# Patient Record
Sex: FEMALE | Race: WHITE | NOT HISPANIC OR LATINO | Employment: PART TIME | URBAN - METROPOLITAN AREA
[De-identification: names, ages, dates, MRNs, and addresses within clinical notes are randomized per-mention and may not be internally consistent; named-entity substitution may affect disease eponyms.]

---

## 2021-11-08 ENCOUNTER — HISTORICAL (OUTPATIENT)
Dept: ADMINISTRATIVE | Facility: HOSPITAL | Age: 26
End: 2021-11-08

## 2021-11-08 LAB
ALBUMIN SERPL-MCNC: 4.5 GM/DL (ref 3.5–5)
ALBUMIN/GLOB SERPL: 1.2 RATIO (ref 1.1–2)
ALP SERPL-CCNC: 57 UNIT/L (ref 40–150)
ALT SERPL-CCNC: 25 UNIT/L (ref 0–55)
AST SERPL-CCNC: 17 UNIT/L (ref 5–34)
BILIRUB SERPL-MCNC: 0.3 MG/DL
BILIRUBIN DIRECT+TOT PNL SERPL-MCNC: 0.1 MG/DL (ref 0–0.5)
BILIRUBIN DIRECT+TOT PNL SERPL-MCNC: 0.2 MG/DL (ref 0–0.8)
BUN SERPL-MCNC: 11.7 MG/DL (ref 7–18.7)
CALCIUM SERPL-MCNC: 10.4 MG/DL (ref 8.7–10.5)
CHLORIDE SERPL-SCNC: 112 MMOL/L (ref 98–107)
CO2 SERPL-SCNC: 18 MMOL/L (ref 22–29)
CREAT SERPL-MCNC: 0.65 MG/DL (ref 0.55–1.02)
EST CREAT CLEARANCE SER (OHS): 206.26 ML/MIN
GLOBULIN SER-MCNC: 3.7 GM/DL (ref 2.4–3.5)
GLUCOSE SERPL-MCNC: 91 MG/DL (ref 74–100)
POTASSIUM SERPL-SCNC: 3.9 MMOL/L (ref 3.5–5.1)
PROT SERPL-MCNC: 8.2 GM/DL (ref 6.4–8.3)
SODIUM SERPL-SCNC: 140 MMOL/L (ref 136–145)
TROPONIN I SERPL-MCNC: 0.01 NG/ML (ref 0–0.04)
TSH SERPL-ACNC: 2.79 UIU/ML (ref 0.35–4.94)

## 2022-01-24 ENCOUNTER — HISTORICAL (OUTPATIENT)
Dept: ADMINISTRATIVE | Facility: HOSPITAL | Age: 27
End: 2022-01-24

## 2022-01-24 LAB
ABS NEUT (OLG): 3.88 X10(3)/MCL (ref 2.1–9.2)
ALBUMIN SERPL-MCNC: 4.5 GM/DL (ref 3.5–5)
ALBUMIN/GLOB SERPL: 1.4 RATIO (ref 1.1–2)
ALP SERPL-CCNC: 58 UNIT/L (ref 40–150)
ALT SERPL-CCNC: 23 UNIT/L (ref 0–55)
AST SERPL-CCNC: 17 UNIT/L (ref 5–34)
BASOPHILS # BLD AUTO: 0.1 X10(3)/MCL (ref 0–0.2)
BASOPHILS NFR BLD AUTO: 1 %
BILIRUB SERPL-MCNC: 0.7 MG/DL
BILIRUBIN DIRECT+TOT PNL SERPL-MCNC: 0.2 MG/DL (ref 0–0.5)
BILIRUBIN DIRECT+TOT PNL SERPL-MCNC: 0.5 MG/DL (ref 0–0.8)
BUN SERPL-MCNC: 13.4 MG/DL (ref 7–18.7)
CALCIUM SERPL-MCNC: 10.1 MG/DL (ref 8.7–10.5)
CHLORIDE SERPL-SCNC: 106 MMOL/L (ref 98–107)
CO2 SERPL-SCNC: 24 MMOL/L (ref 22–29)
CREAT SERPL-MCNC: 0.79 MG/DL (ref 0.55–1.02)
EOSINOPHIL # BLD AUTO: 0.2 X10(3)/MCL (ref 0–0.9)
EOSINOPHIL NFR BLD AUTO: 3 %
ERYTHROCYTE [DISTWIDTH] IN BLOOD BY AUTOMATED COUNT: 12.3 % (ref 11.5–17)
EST. AVERAGE GLUCOSE BLD GHB EST-MCNC: 102.5 MG/DL
GLOBULIN SER-MCNC: 3.3 GM/DL (ref 2.4–3.5)
GLUCOSE SERPL-MCNC: 84 MG/DL (ref 74–100)
HBA1C MFR BLD: 5.2 %
HCT VFR BLD AUTO: 47.3 % (ref 37–47)
HGB BLD-MCNC: 15.3 GM/DL (ref 12–16)
LYMPHOCYTES # BLD AUTO: 3 X10(3)/MCL (ref 0.6–4.6)
LYMPHOCYTES NFR BLD AUTO: 38 %
MCH RBC QN AUTO: 29 PG (ref 27–31)
MCHC RBC AUTO-ENTMCNC: 32.3 GM/DL (ref 33–36)
MCV RBC AUTO: 89.8 FL (ref 80–94)
MONOCYTES # BLD AUTO: 0.5 X10(3)/MCL (ref 0.1–1.3)
MONOCYTES NFR BLD AUTO: 7 %
NEUTROPHILS # BLD AUTO: 3.88 X10(3)/MCL (ref 2.1–9.2)
NEUTROPHILS NFR BLD AUTO: 50 %
PLATELET # BLD AUTO: 363 X10(3)/MCL (ref 130–400)
PMV BLD AUTO: 9.8 FL (ref 9.4–12.4)
POTASSIUM SERPL-SCNC: 4.7 MMOL/L (ref 3.5–5.1)
PROT SERPL-MCNC: 7.8 GM/DL (ref 6.4–8.3)
RBC # BLD AUTO: 5.27 X10(6)/MCL (ref 4.2–5.4)
SODIUM SERPL-SCNC: 141 MMOL/L (ref 136–145)
T4 FREE SERPL-MCNC: 0.88 NG/DL (ref 0.7–1.48)
TSH SERPL-ACNC: 2.24 UIU/ML (ref 0.35–4.94)
WBC # SPEC AUTO: 7.7 X10(3)/MCL (ref 4.5–11.5)

## 2022-02-08 ENCOUNTER — HISTORICAL (OUTPATIENT)
Dept: RADIOLOGY | Facility: HOSPITAL | Age: 27
End: 2022-02-08

## 2022-04-06 LAB
C TRACH DNA SPEC QL NAA+PROBE: NEGATIVE
PAP RECOMMENDATION EXT: NORMAL
PAP SMEAR: NORMAL

## 2022-05-03 NOTE — HISTORICAL [facility]
This is a historical note converted from [employer]. Formatting and pictures may have been removed.  Please reference [employer] for original formatting and attached multimedia. Chief Complaint  dizziness and heart palpatations since OCT 31. Family HX of heart complications  History of Present Illness  Patient presents to Urgent Care for stated complaint during Covid health crisis.  26-year-old female presented to clinic?reporting history of?heart palpitation, anxiety.?Patient states she had an episode?of anxiety?during Halloween,?palpitation restarted?and has not resolved?as well dizziness, denies any recent fall or trauma, denies any headache blurry vision, denies fever?or short of breath?or chest pain?or any other symptoms,?requesting to be referred?to a PCP?and cardiology  Review of Systems  Constitutional: negative except as stated in HPI  Eye: negative except as stated in HPI  ENT: negative except as stated in HPI  Respiratory: negative except as stated in HPI  Cardiovascular: negative except as stated in HPI  Gastrointestinal: negative except as stated in HPI  Genitourinary: negative except as stated in HPI  Hema/Lymph: negative except as stated in HPI  Endocrine: negative except as stated in HPI  Immunologic: negative except as stated in HPI  Musculoskeletal: negative except as stated in HPI  Integumentary: negative except as stated in HPI  Neurologic: negative except as stated in HPI  Physical Exam  Vitals & Measurements  T:?36.9? ?C (Oral)? HR:?89(Peripheral)? RR:?18? BP:?144/84? BP:?120/81(Sitting)? BP:?123/90(Standing)? BP:?118/77(Supine)? SpO2:?97%?  HT:?172.00?cm? WT:?97.900?kg? BMI:?33.09?  Vital signs: Reviewed  General: in no apparent distress, appropriate for age  Head: no signs of head trauma  Eyes: pupils are reactive. ?Extraocular motions intact, conjunctival pink.?  ENT: Bilateral ear canal clear, no edema, no discharge or bleeding. ?Bilateral TMs intact, pearly gray, no effusion, no retraction or  perforation or bulging.  Nose: no sinus tenderness, nasal turbinate normal no erythema, clear nasal discharge.  Mouth: posterior pharynx-clear, uvula midline, facial symmetrical, tongue symmetrical.  Neck :no adenopathy, supple, full range of motion, nontender  Respiratory: clear breath sounds bilaterally. ?No wheezing  Cardiac :regular, no murmur, radial pulses 2+, cap refill less than 2 seconds  Gastrointestinal: Soft, nontender  Musculoskeletal: Moves all extremities, ambulatory without assistant,?bilateral handgrip strong and equal, no arm drift  Integumentary: No rashes or skin lesions visible  Neurologic: Cranial nerves grossly intact, no signs of peripheral neurological deficit  Assessment/Plan  1.?Palpitation?R00.2  Discussed physical exam findings and test results with patient?EKG?statement?shows nonspecific ST and T wave abnormality, noN convincing,?no previous EKG to compare,?troponin I?and CMP?blood work done today?we will notify patient of results, orthostatics within normal limits?PCP referral?and cardiology referral?initiated  Instructed patient to come back to clinic or go to emergency room department if symptom worsens or no improvement or for any other reasons?or call 911  Questions elicited and answered  Patient verbalized understanding of discharge instructions,? will read discharge education instructions  Ordered:  Comprehensive Metabolic Panel, Stat collect, 11/08/21 17:55:00 CST, Blood, Once, Stop date 11/08/21 17:55:00 CST, Nurse collect, Palpitation  Dizziness, 11/08/21 17:55:00 CST  Electrocardiogram, Complete [id number] PC, 11/08/21 17:25:00 CST, 11/08/21 17:25:00 CST, Palpitation  Office/Outpatient Visit Level 4 New [id number] PC, Palpitation  Dizziness, 11/08/21 18:03:00 CST  Orthostatic Vital Signs, 11/08/21 17:54:00 CST, Stop date 11/08/21 17:54:00 CST  Thyroid Stimulating Hormone, Stat collect, 11/08/21 18:05:00 CST, Blood, Stop date 11/08/21 18:05:00 CST, Nurse collect, Palpitation   Dizziness, 11/08/21 18:05:00 CST  Troponin-I, Stat collect, 11/08/21 17:55:00 CST, Blood, Stop date 11/08/21 17:55:00 CST, Nurse collect, Palpitation  Dizziness, 11/08/21 17:55:00 CST  ?  2.?Dizziness?R42  ?As discussed in #1  Ordered:  Comprehensive Metabolic Panel, Stat collect, 11/08/21 17:55:00 CST, Blood, Once, Stop date 11/08/21 17:55:00 CST, Nurse collect, Palpitation  Dizziness, 11/08/21 17:55:00 CST  Office/Outpatient Visit Level 4 New [id number] PC, Palpitation  Dizziness, 11/08/21 18:03:00 CST  Orthostatic Vital Signs, 11/08/21 17:54:00 CST, Stop date 11/08/21 17:54:00 CST  Thyroid Stimulating Hormone, Stat collect, 11/08/21 18:05:00 CST, Blood, Stop date 11/08/21 18:05:00 CST, Nurse collect, Palpitation  Dizziness, 11/08/21 18:05:00 CST  Troponin-I, Stat collect, 11/08/21 17:55:00 CST, Blood, Stop date 11/08/21 17:55:00 CST, Nurse collect, Palpitation  Dizziness, 11/08/21 17:55:00 CST  ?  Referrals  [facility] Internal Referral to Cardiology Clinic, Specialty: Cardiology, Reason: Palpitation, Start: 11/08/21 18:03:00 CST  [facility] Internal Referral to Internal Medicine Clinic, Specialty: Internal Medicine, Reason: Establish PCP, Start: 11/08/21 18:04:00 CST   Problem List/Past Medical History  Ongoing  Obesity  Historical  Anxiety  Medications  Zoloft 100 mg oral tablet, 150 mg= 1.5 tab(s), Oral, Daily, 3 refills,? ?Not taking  Allergies  No Known Medication Allergies  Social History  Abuse/Neglect  No, 09/03/2019  Alcohol  Current, 1-2 times per month, 09/03/2019  Employment/School  Part time, Highest education level: High school., 09/03/2019  Exercise  Exercise frequency: 1-2 times/week. Exercise type: Walking., 09/03/2019  Home/Environment  Lives with Mother. Living situation: Home/Independent., 09/03/2019  Nutrition/Health  Regular, 09/03/2019  Sexual  Sexually active: Yes., 09/03/2019  Spiritual/Cultural  None, 09/03/2019  Substance Use  Past, 1-2 times per month, 11/08/2021  Never, 09/03/2019  Tobacco  Smoker,  current status unknown, No, 09/03/2019  Family History  Congestive heart disease.: Mother and Father.  Diabetes mellitus type 2: Mother and Father.  Primary malignant neoplasm of colon: Mother and Father.  Uterine cancer: Mother.  Immunizations  Vaccine Date Status   poliovirus vaccine, inactivated 07/09/2002 Recorded   measles/mumps/rubella virus vaccine 07/09/2002 Recorded   hepatitis B pediatric vaccine 07/09/2002 Recorded   varicella virus vaccine 09/15/1998 Recorded   measles/mumps/rubella virus vaccine 09/15/1998 Recorded   hepatitis B pediatric vaccine 03/12/1998 Recorded   hepatitis B pediatric vaccine 1997 Recorded   Health Maintenance  Health Maintenance  ???Pending?(in the next year)  ??? ??OverDue  ??? ? ? ?Alcohol Misuse Screening due??01/02/21??and every 1??year(s)  ??? ??Due?  ??? ? ? ?Cervical Cancer Screening due??11/08/21??Unknown Frequency  ??? ? ? ?Tetanus Vaccine due??11/08/21??and every 10??year(s)  ??? ??Refused?  ??? ? ? ?Smoking Cessation due??01/01/21??Variable frequency  ??? ??Due In Future?  ??? ? ? ?Obesity Screening not due until??01/01/22??and every 1??year(s)  ???Satisfied?(in the past 1 year)  ??? ??Satisfied?  ??? ? ? ?ADL Screening on??11/08/21.??Satisfied by Mehul Barrios LPN  ??? ? ? ?Blood Pressure Screening on??11/08/21.??Satisfied by Ysabel Dobson LPN  ??? ? ? ?Body Mass Index Check on??11/08/21.??Satisfied by Mehul Barrios LPN  ??? ? ? ?Depression Screening on??11/08/21.??Satisfied by Mehul Barrios LPN  ??? ? ? ?Influenza Vaccine on??11/08/21.??Satisfied by Mehul Barrios LPN  ??? ? ? ?Obesity Screening on??11/08/21.??Satisfied by Mehul Barrios LPN  ??? ??Refused?  ??? ? ? ?Smoking Cessation on??11/08/21.??Recorded by Sophie CHAVEZ, Mehul Nolen??Reason: Patient Refuses  ?

## 2022-06-02 ENCOUNTER — OFFICE VISIT (OUTPATIENT)
Dept: URGENT CARE | Facility: CLINIC | Age: 27
End: 2022-06-02
Payer: MEDICAID

## 2022-06-02 ENCOUNTER — HOSPITAL ENCOUNTER (OUTPATIENT)
Dept: RADIOLOGY | Facility: HOSPITAL | Age: 27
Discharge: HOME | End: 2022-06-02
Attending: NURSE PRACTITIONER
Payer: MEDICAID

## 2022-06-02 VITALS
RESPIRATION RATE: 20 BRPM | HEIGHT: 69 IN | HEART RATE: 97 BPM | DIASTOLIC BLOOD PRESSURE: 73 MMHG | BODY MASS INDEX: 33.38 KG/M2 | TEMPERATURE: 98 F | WEIGHT: 225.38 LBS | OXYGEN SATURATION: 98 % | SYSTOLIC BLOOD PRESSURE: 108 MMHG

## 2022-06-02 DIAGNOSIS — M79.672 LEFT FOOT PAIN: ICD-10-CM

## 2022-06-02 DIAGNOSIS — S92.352A DISPLACED FRACTURE OF FIFTH METATARSAL BONE, LEFT FOOT, INITIAL ENCOUNTER FOR CLOSED FRACTURE: Primary | ICD-10-CM

## 2022-06-02 PROCEDURE — 73630 X-RAY EXAM OF FOOT: CPT | Mod: 26,LT,, | Performed by: RADIOLOGY

## 2022-06-02 PROCEDURE — 99213 PR OFFICE/OUTPT VISIT, EST, LEVL III, 20-29 MIN: ICD-10-PCS | Mod: S$PBB,,, | Performed by: NURSE PRACTITIONER

## 2022-06-02 PROCEDURE — 99213 OFFICE O/P EST LOW 20 MIN: CPT | Mod: S$PBB,,, | Performed by: NURSE PRACTITIONER

## 2022-06-02 PROCEDURE — 25000003 PHARM REV CODE 250: Performed by: NURSE PRACTITIONER

## 2022-06-02 PROCEDURE — 73630 XR FOOT COMPLETE 3 VIEW LEFT: ICD-10-PCS | Mod: 26,LT,, | Performed by: RADIOLOGY

## 2022-06-02 PROCEDURE — 99214 OFFICE O/P EST MOD 30 MIN: CPT | Mod: PBBFAC | Performed by: NURSE PRACTITIONER

## 2022-06-02 PROCEDURE — 73630 X-RAY EXAM OF FOOT: CPT | Mod: TC,LT

## 2022-06-02 RX ORDER — ACETAMINOPHEN 500 MG
1000 TABLET ORAL
Status: COMPLETED | OUTPATIENT
Start: 2022-06-02 | End: 2022-06-02

## 2022-06-02 RX ADMIN — ACETAMINOPHEN 1000 MG: 500 TABLET ORAL at 10:06

## 2022-06-02 NOTE — PROCEDURES
Procedures: 4 in Orthoglass bright to left foot posteriorly and secured with 4 in Ace wrap.  Crutches dispensed patient demonstrated and verbalized understanding of use. Instructed patient to elevate ice and rest.

## 2022-06-02 NOTE — PROGRESS NOTES
"Subjective:      Patient ID: Yessy Brooks is a 26 y.o. female.    Chief Complaint: Foot Injury (Fell on left foot on yesterday, last night patient states felt her toes go numb.)     26-year-old female presents to the clinic reporting she was hopping on her left foot yesterday which caused her to twisted and fall. Patient report top of the foot with swelling and bruised. Patient wanted to make sure everything is okay and nothing is broken. Patient is ambulating without assistant. Denies any head, denies any loss of consciousness, denies being pregnant.    Review of Systems   Musculoskeletal: Positive for arthralgias (Left dorsal foot pain).   All other systems reviewed and are negative.      /73   Pulse 97   Temp 98.2 °F (36.8 °C)   Resp 20   Ht 5' 9" (1.753 m)   Wt 102.2 kg (225 lb 6.4 oz)   LMP  (LMP Unknown)   SpO2 98%   BMI 33.29 kg/m²    No current outpatient medications on file.  No current facility-administered medications for this visit.    Objective:     Physical Exam  Vitals and nursing note reviewed.   Constitutional:       Appearance: Normal appearance.   HENT:      Head: Normocephalic and atraumatic.      Right Ear: Tympanic membrane, ear canal and external ear normal.      Left Ear: Tympanic membrane, ear canal and external ear normal.      Nose: Nose normal.      Mouth/Throat:      Mouth: Mucous membranes are moist.      Pharynx: Oropharynx is clear.   Eyes:      Extraocular Movements: Extraocular movements intact.      Conjunctiva/sclera: Conjunctivae normal.      Pupils: Pupils are equal, round, and reactive to light.   Cardiovascular:      Rate and Rhythm: Normal rate and regular rhythm.      Pulses: Normal pulses.      Heart sounds: Normal heart sounds. No murmur heard.  Pulmonary:      Effort: Pulmonary effort is normal.      Breath sounds: Normal breath sounds. No wheezing.   Musculoskeletal:         General: Swelling and tenderness present. No deformity. Normal range of " motion.      Cervical back: Normal range of motion and neck supple.      Comments: Left foot dorsal aspect.   Skin:     General: Skin is warm.      Capillary Refill: Capillary refill takes less than 2 seconds.      Findings: Bruising (Left dorsal foot) present.   Neurological:      General: No focal deficit present.      Mental Status: She is alert and oriented to person, place, and time. Mental status is at baseline.      Sensory: No sensory deficit.   Psychiatric:         Mood and Affect: Mood normal.         Behavior: Behavior normal.         Thought Content: Thought content normal.         Judgment: Judgment normal.       Assessment:     Problem List Items Addressed This Visit    None     Visit Diagnoses     Displaced fracture of fifth metatarsal bone, left foot, initial encounter for closed fracture    -  Primary    Relevant Orders    Ambulatory referral/consult to Orthopedics    Left foot pain        Relevant Medications    acetaminophen tablet 1,000 mg (Completed)    Other Relevant Orders    X-Ray Foot Complete Left (Completed)    Ambulatory referral/consult to Orthopedics          Plan:   Discussed physical exam findings with patient,  Fracture of the proximal 5th metatarsal  Of left foot .  Left foot posterior 4 in Orthoglass splint applied, crutches dispensed.  Tylenol 1 g given the clinic for pain and discomfort.   ortho referral initiated  Instructed patient to notify his/ her primary care provider regarding the visit today and schedule a follow-up appointment in 2-3 days  instructed patient to come back to the clinic or go to nearest emergency room department if symptoms worsens or no improvement or for any other reason   questions elicited and answered  Patient verbalized understanding of discharge instructions,  verbalizes understanding to read discharge instructions    Displaced fracture of fifth metatarsal bone, left foot, initial encounter for closed fracture  -     Ambulatory referral/consult to  Orthopedics    Left foot pain  -     X-Ray Foot Complete Left; Future; Expected date: 06/02/2022  -     acetaminophen tablet 1,000 mg  -     Ambulatory referral/consult to Orthopedics         Recent Lab Results     None         X-Ray Foot Complete Left    Result Date: 6/2/2022  EXAMINATION: XR FOOT COMPLETE 3 VIEW LEFT CLINICAL HISTORY: TWISTED LEFT FOOT/  BRUISING AND SWELLING NOTED TO  DORSAL ASPECT OF FOOT;  Pain in left foot TECHNIQUE: Radiographs of the left foot with AP, lateral and oblique  views. COMPARISON: No prior imaging available for comparison FINDINGS: Fracture of the proximal 5th metatarsal.  No additional fracture appreciated.     Minimally displaced fracture of the proximal 5th metatarsal. Electronically signed by: Fam Sutton Date:    06/02/2022 Time:    11:02        This note was created with the assistance of a voice recognition software or  phone dictation. There may be transcription errors as a result of using this technology, however minimal effort has been made to assure accuracy of transcription, but any obvious errors or omissions should be clarified with the author of the document.

## 2022-06-07 ENCOUNTER — CLINICAL SUPPORT (OUTPATIENT)
Dept: GYNECOLOGY | Facility: CLINIC | Age: 27
End: 2022-06-07
Payer: MEDICAID

## 2022-06-07 DIAGNOSIS — Z23 NEED FOR HPV VACCINE: Primary | ICD-10-CM

## 2022-06-07 PROCEDURE — 99211 OFF/OP EST MAY X REQ PHY/QHP: CPT | Mod: PBBFAC

## 2022-06-07 PROCEDURE — 90651 9VHPV VACCINE 2/3 DOSE IM: CPT | Mod: PBBFAC

## 2022-06-07 NOTE — PROGRESS NOTES
2nd HPV Vaccine administered per [facility] HPV Protocol. 2 patient identifiers used/verified. Patient tolerated well and left in stable condition.

## 2022-06-27 ENCOUNTER — HOSPITAL ENCOUNTER (OUTPATIENT)
Dept: RADIOLOGY | Facility: HOSPITAL | Age: 27
Discharge: HOME | End: 2022-06-27
Attending: FAMILY MEDICINE
Payer: MEDICAID

## 2022-06-27 ENCOUNTER — OFFICE VISIT (OUTPATIENT)
Dept: URGENT CARE | Facility: CLINIC | Age: 27
End: 2022-06-27
Payer: MEDICAID

## 2022-06-27 VITALS
HEIGHT: 69 IN | HEART RATE: 95 BPM | DIASTOLIC BLOOD PRESSURE: 80 MMHG | RESPIRATION RATE: 20 BRPM | OXYGEN SATURATION: 97 % | TEMPERATURE: 99 F | BODY MASS INDEX: 33.33 KG/M2 | SYSTOLIC BLOOD PRESSURE: 120 MMHG | WEIGHT: 225 LBS

## 2022-06-27 DIAGNOSIS — S92.355D CLOSED NONDISPLACED FRACTURE OF FIFTH METATARSAL BONE OF LEFT FOOT WITH ROUTINE HEALING, SUBSEQUENT ENCOUNTER: ICD-10-CM

## 2022-06-27 DIAGNOSIS — S92.355D CLOSED NONDISPLACED FRACTURE OF FIFTH METATARSAL BONE OF LEFT FOOT WITH ROUTINE HEALING, SUBSEQUENT ENCOUNTER: Primary | ICD-10-CM

## 2022-06-27 PROCEDURE — 73630 XR FOOT COMPLETE 3 VIEW LEFT: ICD-10-PCS | Mod: 26,LT,, | Performed by: RADIOLOGY

## 2022-06-27 PROCEDURE — 73630 X-RAY EXAM OF FOOT: CPT | Mod: 26,LT,, | Performed by: RADIOLOGY

## 2022-06-27 PROCEDURE — 99213 OFFICE O/P EST LOW 20 MIN: CPT | Mod: PBBFAC | Performed by: FAMILY MEDICINE

## 2022-06-27 PROCEDURE — 99214 PR OFFICE/OUTPT VISIT, EST, LEVL IV, 30-39 MIN: ICD-10-PCS | Mod: S$PBB,,, | Performed by: FAMILY MEDICINE

## 2022-06-27 PROCEDURE — 73630 X-RAY EXAM OF FOOT: CPT | Mod: TC,LT

## 2022-06-27 PROCEDURE — 99214 OFFICE O/P EST MOD 30 MIN: CPT | Mod: S$PBB,,, | Performed by: FAMILY MEDICINE

## 2022-06-27 NOTE — PROGRESS NOTES
"Subjective:       Patient ID: Yessy Brooks is a 26 y.o. female.    Vitals:  height is 5' 8.9" (1.75 m) and weight is 102.1 kg (225 lb). Her temperature is 99 °F (37.2 °C). Her blood pressure is 120/80 and her pulse is 95. Her respiration is 20 and oxygen saturation is 97%.     Chief Complaint: Foot Injury (Seen on June 2 states has lt foot fracture, needs referral to ortho) and Referral (ortho)    Patient presents to urgent care for follow-up.  History of left proximal 5th metatarsal fracture on June 1st.  Was seen in urgent care on June 2nd.  Placed in posterior splint and has been nonweightbearing.  There has been a system problem obtaining an orthopedic referral appointment.  I am unsure if this has been resolved.  She has been doing well, using crutches, having no significant pain, no numbness or tingling.  Has had no swelling of the toes.     Foot Injury          ROS    Objective:      Physical Exam    VITAL SIGNS:  Reviewed.      GENERAL:  In no apparent distress  HEAD:  No signs of head trauma    MUSCULOSKELETAL:  Left foot-patient in posterior splint.  Removed.  Small amount of soft tissue swelling over the base of the 5th metatarsal, minimal tenderness.  Ankle has full range of motion.  No vascular compromise of the foot.  NEUROLOGIC EXAM:  Alert and oriented x 3.  No focal sensory or strength deficits.   Speech normal.  Follows commands    XR FOOT COMPLETE 3 VIEW LEFT    Result Date: 6/27/2022  EXAMINATION: XR FOOT COMPLETE 3 VIEW LEFT CLINICAL HISTORY: Nondisplaced fracture of fifth metatarsal bone, left foot, subsequent encounter for fracture with routine healing History of proximal 5th metatarsal fracture 3-4 weeks ago; COMPARISON: X-rays dated 06/02/2022 FINDINGS: Redemonstrated is a nondisplaced fracture of the base of the 5th metatarsal with slight bony resorption along the fracture lines.  There is no new acute fracture identified.  The soft tissues are unremarkable.     Stable alignment of the " nondisplaced 5th metatarsal base fracture. Electronically signed by: Maddy Garner Date:    06/27/2022 Time:    17:18    X-Ray Foot Complete Left    Result Date: 6/2/2022  EXAMINATION: XR FOOT COMPLETE 3 VIEW LEFT CLINICAL HISTORY: TWISTED LEFT FOOT/  BRUISING AND SWELLING NOTED TO  DORSAL ASPECT OF FOOT;  Pain in left foot TECHNIQUE: Radiographs of the left foot with AP, lateral and oblique  views. COMPARISON: No prior imaging available for comparison FINDINGS: Fracture of the proximal 5th metatarsal.  No additional fracture appreciated.     Minimally displaced fracture of the proximal 5th metatarsal. Electronically signed by: Fam Sutton Date:    06/02/2022 Time:    11:02    Assessment:       1. Closed nondisplaced fracture of fifth metatarsal bone of left foot with routine healing, subsequent encounter          Plan:         Closed nondisplaced fracture of fifth metatarsal bone of left foot with routine healing, subsequent encounter  -     XR FOOT COMPLETE 3 VIEW LEFT; Future; Expected date: 06/27/2022         Discussed x-ray.  Reviewed films.  Placed in cam boot, no weight-bearing.  Instructed her to wait for Orthopedic Clinic to contact her.  Return to urgent care if any

## 2022-07-08 ENCOUNTER — OFFICE VISIT (OUTPATIENT)
Dept: ORTHOPEDICS | Facility: CLINIC | Age: 27
End: 2022-07-08
Payer: MEDICAID

## 2022-07-08 ENCOUNTER — HOSPITAL ENCOUNTER (OUTPATIENT)
Dept: RADIOLOGY | Facility: HOSPITAL | Age: 27
Discharge: HOME | End: 2022-07-08
Attending: ORTHOPAEDIC SURGERY
Payer: MEDICAID

## 2022-07-08 VITALS — HEIGHT: 69 IN | WEIGHT: 220 LBS | BODY MASS INDEX: 32.58 KG/M2

## 2022-07-08 DIAGNOSIS — M79.672 LEFT FOOT PAIN: ICD-10-CM

## 2022-07-08 DIAGNOSIS — M79.672 LEFT FOOT PAIN: Primary | ICD-10-CM

## 2022-07-08 PROCEDURE — 99204 OFFICE O/P NEW MOD 45 MIN: CPT | Mod: S$PBB,,, | Performed by: ORTHOPAEDIC SURGERY

## 2022-07-08 PROCEDURE — 1159F MED LIST DOCD IN RCRD: CPT | Mod: CPTII,,, | Performed by: ORTHOPAEDIC SURGERY

## 2022-07-08 PROCEDURE — 1159F PR MEDICATION LIST DOCUMENTED IN MEDICAL RECORD: ICD-10-PCS | Mod: CPTII,,, | Performed by: ORTHOPAEDIC SURGERY

## 2022-07-08 PROCEDURE — 1160F RVW MEDS BY RX/DR IN RCRD: CPT | Mod: CPTII,,, | Performed by: ORTHOPAEDIC SURGERY

## 2022-07-08 PROCEDURE — 3008F BODY MASS INDEX DOCD: CPT | Mod: CPTII,,, | Performed by: ORTHOPAEDIC SURGERY

## 2022-07-08 PROCEDURE — 99204 PR OFFICE/OUTPT VISIT, NEW, LEVL IV, 45-59 MIN: ICD-10-PCS | Mod: S$PBB,,, | Performed by: ORTHOPAEDIC SURGERY

## 2022-07-08 PROCEDURE — 1160F PR REVIEW ALL MEDS BY PRESCRIBER/CLIN PHARMACIST DOCUMENTED: ICD-10-PCS | Mod: CPTII,,, | Performed by: ORTHOPAEDIC SURGERY

## 2022-07-08 PROCEDURE — 99212 OFFICE O/P EST SF 10 MIN: CPT | Mod: PBBFAC

## 2022-07-08 PROCEDURE — 3008F PR BODY MASS INDEX (BMI) DOCUMENTED: ICD-10-PCS | Mod: CPTII,,, | Performed by: ORTHOPAEDIC SURGERY

## 2022-07-08 PROCEDURE — 73630 X-RAY EXAM OF FOOT: CPT | Mod: TC,LT

## 2022-07-08 NOTE — PROGRESS NOTES
[facility]  Established Patient Office Visit  07/08/2022       Patient ID: Yessy Brooks  YOB: 1995  MRN: [id number]    Diagnosis:    The encounter diagnosis was Left foot pain.     Chief Complaint: Fracture of the Left Foot      Yessy Brooks is a 26 y.o. female who presents as a new patient for treatment of the above mentioned diagnosis.  Patient states she twisted her foot on June 1, 2022 while walking in her backyard.  She had pain and swelling on the lateral aspect of her foot.  She went to her local emergency room and was diagnosed with a fracture of the base of her 5th metatarsal.  She was referred to our clinic for follow-up assessment and tentative management.    Patient has been nonweightbearing and wearing a cam boot since the time of injury.    Occupation:    Sports/Hobbies:  None   Hand dominance:  Not applicable  Smoking:  None    Past Medical History:    History reviewed. No pertinent past medical history.  History reviewed. No pertinent surgical history.  Family History   Problem Relation Age of Onset    Hypertension Mother     Diabetes Mother     Heart disease Mother     Heart disease Father      Social History     Socioeconomic History    Marital status: Single   Tobacco Use    Smoking status: Never Smoker    Smokeless tobacco: Never Used   Substance and Sexual Activity    Alcohol use: Yes    Drug use: Never    Sexual activity: Yes       Review of patient's allergies indicates:  No Known Allergies    ROS:    Body mass index is 32.49 kg/m².  GENERAL: Well appearing, appropriate for stated age, no acute distress.  CARDIOVASCULAR: Pulses regular by peripheral palpation.  PULMONARY: Respirations are even and non-labored.  NEURO: Awake, alert, and oriented x 3.  PSYCH: Mood & affect are appropriate.  HEENT: Head is normocephalic and atraumatic.    Physical Exam:    Left foot:  Patient neurovascular intact distally the left  foot.  Skin is intact it is closed injury.  There is no tenderness to palpation at the fracture site.    Imaging:    No image results found.     Relevant imaging results reviewed and interpreted by me, discussed with the patient and / or family today.     X-ray performed today reviewed patient demonstrates a nondisplaced fracture of the base of her 5th metatarsal.    Assessment and Plan:    Yessy Brooks is a 26 y.o. female seen in the office today for The encounter diagnosis was Left foot pain..  Overall patient is doing very well now more than 5 weeks status post fracture of the base of her 5th metatarsal on the left side.  Patient may advance her weight-bearing to weight-bearing as tolerated and discontinue use of her Cam boot.  She may return to all of her activities with no restrictions.  I will see her back in 6 weeks time for a final clinical check.      Orders Placed This Encounter    X-Ray Foot Complete Left

## 2022-08-08 ENCOUNTER — OFFICE VISIT (OUTPATIENT)
Dept: FAMILY MEDICINE | Facility: CLINIC | Age: 27
End: 2022-08-08
Payer: MEDICAID

## 2022-08-08 VITALS
TEMPERATURE: 99 F | BODY MASS INDEX: 33.46 KG/M2 | DIASTOLIC BLOOD PRESSURE: 89 MMHG | OXYGEN SATURATION: 97 % | RESPIRATION RATE: 18 BRPM | SYSTOLIC BLOOD PRESSURE: 133 MMHG | WEIGHT: 225.88 LBS | HEIGHT: 69 IN | HEART RATE: 82 BPM

## 2022-08-08 DIAGNOSIS — E66.9 CLASS 1 OBESITY WITH BODY MASS INDEX (BMI) OF 33.0 TO 33.9 IN ADULT, UNSPECIFIED OBESITY TYPE, UNSPECIFIED WHETHER SERIOUS COMORBIDITY PRESENT: Chronic | ICD-10-CM

## 2022-08-08 DIAGNOSIS — F41.9 ANXIETY: Primary | ICD-10-CM

## 2022-08-08 DIAGNOSIS — Z87.898 HISTORY OF PALPITATIONS: ICD-10-CM

## 2022-08-08 PROCEDURE — 1159F PR MEDICATION LIST DOCUMENTED IN MEDICAL RECORD: ICD-10-PCS | Mod: CPTII,,,

## 2022-08-08 PROCEDURE — 1159F MED LIST DOCD IN RCRD: CPT | Mod: CPTII,,,

## 2022-08-08 PROCEDURE — 99213 PR OFFICE/OUTPT VISIT, EST, LEVL III, 20-29 MIN: ICD-10-PCS | Mod: S$PBB,,,

## 2022-08-08 PROCEDURE — 99213 OFFICE O/P EST LOW 20 MIN: CPT | Mod: PBBFAC,PN

## 2022-08-08 PROCEDURE — 3008F PR BODY MASS INDEX (BMI) DOCUMENTED: ICD-10-PCS | Mod: CPTII,,,

## 2022-08-08 PROCEDURE — 3079F PR MOST RECENT DIASTOLIC BLOOD PRESSURE 80-89 MM HG: ICD-10-PCS | Mod: CPTII,,,

## 2022-08-08 PROCEDURE — 1160F PR REVIEW ALL MEDS BY PRESCRIBER/CLIN PHARMACIST DOCUMENTED: ICD-10-PCS | Mod: CPTII,,,

## 2022-08-08 PROCEDURE — 1160F RVW MEDS BY RX/DR IN RCRD: CPT | Mod: CPTII,,,

## 2022-08-08 PROCEDURE — 3079F DIAST BP 80-89 MM HG: CPT | Mod: CPTII,,,

## 2022-08-08 PROCEDURE — 3008F BODY MASS INDEX DOCD: CPT | Mod: CPTII,,,

## 2022-08-08 PROCEDURE — 3075F PR MOST RECENT SYSTOLIC BLOOD PRESS GE 130-139MM HG: ICD-10-PCS | Mod: CPTII,,,

## 2022-08-08 PROCEDURE — 99213 OFFICE O/P EST LOW 20 MIN: CPT | Mod: S$PBB,,,

## 2022-08-08 PROCEDURE — 3075F SYST BP GE 130 - 139MM HG: CPT | Mod: CPTII,,,

## 2022-08-08 NOTE — PROGRESS NOTES
Patient Name: Yessy Brooks   : 1995  MRN: [id number]     Subjective:   Patient ID: Yessy Brooks is a 27 y.o. female.    Chief Complaint:   Chief Complaint   Patient presents with    Follow-up        HPI: 2022:  Following up on her anxiety and history of palpitations.  Since last visit she has had her annual Pap smear within normal limits with gyn Clinic.  Her Holter monitor showed no abnormalities.  Patient states also that her palpitations have resolved.  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  Other than recently breaking her left foot she has no complaints.    22: 26 Years  Female presents for follow up visit. patient followed up with cardiology on  they ordered a 30-day Holter monitor and stress test to further assess possible arrhythmias.  TTE on  showed ejection fraction of 62% with no valvular   abnormalities. Patient states her anxiety is much better, today her KEVEN is 9 (previously 12). She states she has essentially less palpitations. Denies chest pain, shortness of breath, cough, headache, dizziness, weakness, abdominal pain, nausea, vomiting, diarrhea, constipation, dysuria, depression, anxiety, SI, and HI.     22: Patient's main concerns are anxiety and palpitations, she used to be on Zoloft 50 mg for a couple of years stopped taking it over 3 years ago.  KEVEN today is 12.  She states her anxiety is much better than it used to be.  She participates in counseling with [facility] [employer] [employer].   Patient was worked up in the urgent care on  for palpitations, troponin and EKG were within normal limits.  At this time she was referred to cardiology clinic.  She has an appointment on  for this.  She states that they had no further work-up at this time.  Patient does endorse that she has high anxiety.  Denies shortness of breath or palpitations at this time      ROS:  Review of Systems   Constitutional: Negative for  "chills, fever and weight loss.   HENT: Negative for ear discharge, nosebleeds and tinnitus.    Eyes: Negative for blurred vision, photophobia and pain.   Respiratory: Negative for cough, shortness of breath, wheezing and stridor.    Cardiovascular: Negative for chest pain, palpitations and orthopnea.   Gastrointestinal: Negative for abdominal pain, heartburn and nausea.   Genitourinary: Negative for dysuria, frequency, hematuria and urgency.   Musculoskeletal: Positive for joint pain. Negative for falls and myalgias.   Skin: Negative for itching and rash.   Neurological: Negative for dizziness, sensory change, speech change, focal weakness, seizures, weakness and headaches.   Endo/Heme/Allergies: Negative for environmental allergies. Does not bruise/bleed easily.   Psychiatric/Behavioral: Negative for depression, hallucinations and suicidal ideas. The patient is nervous/anxious.       History:   History reviewed. No pertinent past medical history.   History reviewed. No pertinent surgical history.  Family History   Problem Relation Age of Onset    Hypertension Mother     Diabetes Mother     Heart disease Mother     Heart disease Father       Social History     Tobacco Use    Smoking status: Never Smoker    Smokeless tobacco: Never Used   Substance and Sexual Activity    Alcohol use: Yes    Drug use: Never    Sexual activity: Yes        Allergies: Review of patient's allergies indicates:  No Known Allergies  Objective:     Vitals:    08/08/22 0719   BP: 133/89   Pulse: 82   Resp: 18   Temp: 99.3 °F (37.4 °C)   SpO2: 97%   Weight: 102.5 kg (225 lb 14.4 oz)   Height: 5' 9" (1.753 m)     Body mass index is 33.36 kg/m².     Physical Examination:   Physical Exam  Vitals reviewed.   Constitutional:       Appearance: Normal appearance. She is normal weight.   HENT:      Head: Normocephalic.      Right Ear: Tympanic membrane, ear canal and external ear normal.      Left Ear: Tympanic membrane, ear canal and external " ear normal.      Nose: Nose normal.      Mouth/Throat:      Mouth: Mucous membranes are moist.      Pharynx: Oropharynx is clear.   Eyes:      Extraocular Movements: Extraocular movements intact.      Conjunctiva/sclera: Conjunctivae normal.      Pupils: Pupils are equal, round, and reactive to light.   Cardiovascular:      Rate and Rhythm: Normal rate and regular rhythm.      Pulses: Normal pulses.      Heart sounds: Normal heart sounds.   Pulmonary:      Effort: Pulmonary effort is normal.      Breath sounds: Normal breath sounds.   Abdominal:      General: Abdomen is flat. Bowel sounds are normal.      Palpations: Abdomen is soft.   Musculoskeletal:      Cervical back: Normal range of motion and neck supple.      Left ankle: Decreased range of motion.   Skin:     General: Skin is warm and dry.   Neurological:      General: No focal deficit present.      Mental Status: She is alert and oriented to person, place, and time.   Psychiatric:         Mood and Affect: Mood normal.         Behavior: Behavior normal.         Assessment:     Problem List Items Addressed This Visit        Psychiatric    Anxiety - Primary (Chronic)    Overview       Practice deep breathing or abdominal breathing exercises when anxiety occurs.  Exercise daily. Get sunlight daily.  Avoid caffeine, alcohol and stimulants.  Practice positive phrases and repeat throughout the day, yoga, lavender scents or Chamomile tea will help anxiety.  Set healthy boundaries, avoid people and conversations that increase stress.  Reports any symptoms of suicidal or homicidal ideations immediately, if clinic is closed go to nearest emergency room.                  Cardiac/Vascular    History of palpitations (Chronic)    Current Assessment & Plan     No longer experiencing these              Endocrine    Obesity (Chronic)    Overview     BMI Body mass index is 33.36 kg/m².   Goal BMI <30.  Exercise 5 times a week for 30 minutes per day.  Avoid soda, simple sugars,  excessive rice, potatoes or bread. Limit fast foods and fried foods.  Choose complex carbs in moderation (example: green vegetables, beans, oatmeal). Eat plenty of fresh fruits and vegetables with lean meats daily.  Do not skip meals. Eat a balanced portion size.  Avoid fad diets. Consider permanent healthy life style changes.                      Plan:   Yessy was seen today for follow-up.    Diagnoses and all orders for this visit:    Anxiety    History of palpitations    Class 1 obesity with body mass index (BMI) of 33.0 to 33.9 in adult, unspecified obesity type, unspecified whether serious comorbidity present       Follow up in about 6 months (around 2/8/2023) for routine labs recheck.       This note was created with the assistance of a voice recognition software or phone dictation. There may be transcription errors as a result of using this technology however minimal. Effort has been made to assure accuracy of transcription but any obvious errors or omissions should be clarified with the author of the document

## 2022-08-22 ENCOUNTER — HOSPITAL ENCOUNTER (OUTPATIENT)
Dept: RADIOLOGY | Facility: HOSPITAL | Age: 27
Discharge: HOME | End: 2022-08-22
Attending: STUDENT IN AN ORGANIZED HEALTH CARE EDUCATION/TRAINING PROGRAM
Payer: MEDICAID

## 2022-08-22 ENCOUNTER — OFFICE VISIT (OUTPATIENT)
Dept: ORTHOPEDICS | Facility: CLINIC | Age: 27
End: 2022-08-22
Payer: MEDICAID

## 2022-08-22 VITALS — HEIGHT: 69 IN | WEIGHT: 226 LBS | BODY MASS INDEX: 33.47 KG/M2

## 2022-08-22 DIAGNOSIS — S92.352A CLOSED FRACTURE OF BASE OF FIFTH METATARSAL BONE OF LEFT FOOT, INITIAL ENCOUNTER: Primary | ICD-10-CM

## 2022-08-22 DIAGNOSIS — S92.352A CLOSED FRACTURE OF BASE OF FIFTH METATARSAL BONE OF LEFT FOOT, INITIAL ENCOUNTER: ICD-10-CM

## 2022-08-22 PROCEDURE — 3008F BODY MASS INDEX DOCD: CPT | Mod: CPTII,,, | Performed by: ORTHOPAEDIC SURGERY

## 2022-08-22 PROCEDURE — 3008F PR BODY MASS INDEX (BMI) DOCUMENTED: ICD-10-PCS | Mod: CPTII,,, | Performed by: ORTHOPAEDIC SURGERY

## 2022-08-22 PROCEDURE — 99212 OFFICE O/P EST SF 10 MIN: CPT | Mod: PBBFAC | Performed by: ORTHOPAEDIC SURGERY

## 2022-08-22 PROCEDURE — 99213 PR OFFICE/OUTPT VISIT, EST, LEVL III, 20-29 MIN: ICD-10-PCS | Mod: S$PBB,,, | Performed by: ORTHOPAEDIC SURGERY

## 2022-08-22 PROCEDURE — 99212 OFFICE O/P EST SF 10 MIN: CPT | Mod: PBBFAC

## 2022-08-22 PROCEDURE — 73630 X-RAY EXAM OF FOOT: CPT | Mod: TC,LT

## 2022-08-22 PROCEDURE — 1159F MED LIST DOCD IN RCRD: CPT | Mod: CPTII,,, | Performed by: ORTHOPAEDIC SURGERY

## 2022-08-22 PROCEDURE — 1159F PR MEDICATION LIST DOCUMENTED IN MEDICAL RECORD: ICD-10-PCS | Mod: CPTII,,, | Performed by: ORTHOPAEDIC SURGERY

## 2022-08-22 PROCEDURE — 99213 OFFICE O/P EST LOW 20 MIN: CPT | Mod: S$PBB,,, | Performed by: ORTHOPAEDIC SURGERY

## 2022-08-22 NOTE — PROGRESS NOTES
[facility]  Established Patient Office Visit  08/22/2022       Patient ID: Yessy Brooks  YOB: 1995  MRN: [id number]    Diagnosis:    The encounter diagnosis was Closed fracture of base of fifth metatarsal bone of left foot, initial encounter.     Procedure:     No surgery found on No surgery found      Chief Complaint: No chief complaint on file.      Yessy Brooks is a 27 y.o. female who presents for follow up treatment of the above mentioned diagnosis. The patient's last visit with me was on 7/8/2022.      27F with history of L foot injury 6/1/2022 found to have a base of the 5th metatarsal fracture, doing well at last visit on 7/8/2022.  We progressed her to WBAT and recommended discontinuation of the CAM boot.    She returns today for follow up, now about 12 weeks from injury.  She is back in regular shoes and has almost no pain.  Every now and then she'll feel a slight twinge but really is back to walking in normal shoes without limitations.  No new injuries.      Past Medical History:    No past medical history on file.  No past surgical history on file.  Family History   Problem Relation Age of Onset    Hypertension Mother     Diabetes Mother     Heart disease Mother     Heart disease Father      Social History     Socioeconomic History    Marital status: Single   Tobacco Use    Smoking status: Never Smoker    Smokeless tobacco: Never Used   Substance and Sexual Activity    Alcohol use: Yes    Drug use: Never    Sexual activity: Yes       Review of patient's allergies indicates:  No Known Allergies    ROS:    There is no height or weight on file to calculate BMI.  GENERAL: Well appearing, appropriate for stated age, no acute distress.  CARDIOVASCULAR: Pulses regular by peripheral palpation.  PULMONARY: Respirations are even and non-labored.  NEURO: Awake, alert, and oriented x 3.  PSYCH: Mood & affect are appropriate.  HEENT: Head is normocephalic  and atraumatic.    Physical Exam:    Left foot:  No abrasions or open wounds  NonTTP base of the 5th MT base  TA/gastroc/EHL/FHL intact  SILT throughout  WWP    Imaging:    Relevant imaging results reviewed and interpreted by me, discussed with the patient and / or family today.     XR L foot:  Healed base of the 5th MT fracture    Assessment and Plan:    Yessy Brooks is a 27 y.o. female seen in the office today for The encounter diagnosis was Closed fracture of base of fifth metatarsal bone of left foot, initial encounter.     27F with history of L foot injury 6/1/2022 found to have a base of the 5th metatarsal fracture, healed, doing well.  - WBAT, no restrictions  - Follow up PRN      Orders Placed This Encounter    X-Ray Foot Complete Left      Lashae Saunders MD, PGY-5  [facility]

## 2022-08-22 NOTE — PROGRESS NOTES
ATTENDING ADDENDUM    Yessy Brooks  was evaluated at the time of the encounter with Dr Castrejon PGY3.  HPI, PE and treatment plan was reviewed. Treatment plan was reasonable and necessary. Imaging was reviewed at the time of visit.

## 2022-08-29 ENCOUNTER — DOCUMENTATION ONLY (OUTPATIENT)
Dept: ADMINISTRATIVE | Facility: HOSPITAL | Age: 27
End: 2022-08-29
Payer: MEDICAID

## 2022-11-09 ENCOUNTER — OFFICE VISIT (OUTPATIENT)
Dept: FAMILY MEDICINE | Facility: CLINIC | Age: 27
End: 2022-11-09
Payer: MEDICAID

## 2022-11-09 VITALS
HEIGHT: 69 IN | RESPIRATION RATE: 20 BRPM | TEMPERATURE: 99 F | SYSTOLIC BLOOD PRESSURE: 127 MMHG | HEART RATE: 91 BPM | WEIGHT: 224.38 LBS | BODY MASS INDEX: 33.23 KG/M2 | DIASTOLIC BLOOD PRESSURE: 73 MMHG | OXYGEN SATURATION: 97 %

## 2022-11-09 DIAGNOSIS — F41.9 ANXIETY: Chronic | ICD-10-CM

## 2022-11-09 DIAGNOSIS — S92.352A CLOSED FRACTURE OF BASE OF FIFTH METATARSAL BONE OF LEFT FOOT: ICD-10-CM

## 2022-11-09 DIAGNOSIS — Z87.898 HISTORY OF PALPITATIONS: Chronic | ICD-10-CM

## 2022-11-09 DIAGNOSIS — R11.0 NAUSEA: ICD-10-CM

## 2022-11-09 DIAGNOSIS — Z00.00 ENCOUNTER FOR WELLNESS EXAMINATION: Primary | ICD-10-CM

## 2022-11-09 PROCEDURE — 99214 OFFICE O/P EST MOD 30 MIN: CPT | Mod: S$PBB,,,

## 2022-11-09 PROCEDURE — 3008F PR BODY MASS INDEX (BMI) DOCUMENTED: ICD-10-PCS | Mod: CPTII,,,

## 2022-11-09 PROCEDURE — 3078F DIAST BP <80 MM HG: CPT | Mod: CPTII,,,

## 2022-11-09 PROCEDURE — 99214 PR OFFICE/OUTPT VISIT, EST, LEVL IV, 30-39 MIN: ICD-10-PCS | Mod: S$PBB,,,

## 2022-11-09 PROCEDURE — 1160F PR REVIEW ALL MEDS BY PRESCRIBER/CLIN PHARMACIST DOCUMENTED: ICD-10-PCS | Mod: CPTII,,,

## 2022-11-09 PROCEDURE — 3008F BODY MASS INDEX DOCD: CPT | Mod: CPTII,,,

## 2022-11-09 PROCEDURE — 3078F PR MOST RECENT DIASTOLIC BLOOD PRESSURE < 80 MM HG: ICD-10-PCS | Mod: CPTII,,,

## 2022-11-09 PROCEDURE — 1159F PR MEDICATION LIST DOCUMENTED IN MEDICAL RECORD: ICD-10-PCS | Mod: CPTII,,,

## 2022-11-09 PROCEDURE — 1159F MED LIST DOCD IN RCRD: CPT | Mod: CPTII,,,

## 2022-11-09 PROCEDURE — 3074F PR MOST RECENT SYSTOLIC BLOOD PRESSURE < 130 MM HG: ICD-10-PCS | Mod: CPTII,,,

## 2022-11-09 PROCEDURE — 99213 OFFICE O/P EST LOW 20 MIN: CPT | Mod: PBBFAC,PN

## 2022-11-09 PROCEDURE — 3074F SYST BP LT 130 MM HG: CPT | Mod: CPTII,,,

## 2022-11-09 PROCEDURE — 1160F RVW MEDS BY RX/DR IN RCRD: CPT | Mod: CPTII,,,

## 2022-11-09 RX ORDER — BUSPIRONE HYDROCHLORIDE 10 MG/1
10 TABLET ORAL 2 TIMES DAILY
Qty: 60 TABLET | Refills: 1 | Status: SHIPPED | OUTPATIENT
Start: 2022-11-09 | End: 2022-12-12 | Stop reason: SDUPTHER

## 2022-11-09 RX ORDER — ONDANSETRON 4 MG/1
4 TABLET, ORALLY DISINTEGRATING ORAL 2 TIMES DAILY PRN
Qty: 20 TABLET | Refills: 0 | Status: SHIPPED | OUTPATIENT
Start: 2022-11-09

## 2022-11-09 NOTE — PROGRESS NOTES
Patient Name: Yessy Brooks   : 1995  MRN: [id number]     Subjective:   Patient ID: Yessy Brooks is a 27 y.o. female.    Chief Complaint:   Chief Complaint   Patient presents with    Follow-up        HPI: 2022:  Patient presents to clinic today to talk about starting medication for anxiety she is continued to be anxious despite working with therapist.  Kayleigh in her therapist at [facility] suggested talk to PCP about medication.  Patient has done little bit of online research and is interested in BusPar or Wellbutrin.  Both these medications discussed at length patient.  Patient denies SI/HI.  Patient denies any acute panic attacks. PHQ score today was PHQ-4 Score: 10       2022:  Following up on her anxiety and history of palpitations.  Since last visit she has had her annual Pap smear within normal limits with gyn Clinic.  Her Holter monitor showed no abnormalities.  Patient states also that her palpitations have resolved.  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  Other than recently breaking her left foot she has no complaints.    22: 26 Years  Female presents for follow up visit. patient followed up with cardiology on  they ordered a 30-day Holter monitor and stress test to further assess possible arrhythmias.  TTE on  showed ejection fraction of 62% with no valvular   abnormalities. Patient states her anxiety is much better, today her KEVEN is 9 (previously 12). She states she has essentially less palpitations. Denies chest pain, shortness of breath, cough, headache, dizziness, weakness, abdominal pain, nausea, vomiting, diarrhea, constipation, dysuria, depression, anxiety, SI, and HI.     22: Patient's main concerns are anxiety and palpitations, she used to be on Zoloft 50 mg for a couple of years stopped taking it over 3 years ago.  KEVEN today is 12.  She states her anxiety is much better than it used to be.  She  participates in counseling with Evon at [facility].   Patient was worked up in the urgent care on November 8 for palpitations, troponin and EKG were within normal limits.  At this time she was referred to cardiology clinic.  She has an appointment on February 24 for this.  She states that they had no further work-up at this time.  Patient does endorse that she has high anxiety.  Denies shortness of breath or palpitations at this time      ROS:  Review of Systems   Constitutional:  Negative for chills, fever and weight loss.   HENT:  Negative for ear discharge, nosebleeds and tinnitus.    Eyes:  Negative for blurred vision, photophobia and pain.   Respiratory:  Negative for cough, shortness of breath, wheezing and stridor.    Cardiovascular:  Negative for chest pain, palpitations and orthopnea.   Gastrointestinal:  Negative for abdominal pain, heartburn and nausea.   Genitourinary:  Negative for dysuria, frequency, hematuria and urgency.   Musculoskeletal:  Negative for falls and myalgias.   Skin:  Negative for itching and rash.   Neurological:  Negative for dizziness, sensory change, speech change, focal weakness, seizures, weakness and headaches.   Endo/Heme/Allergies:  Negative for environmental allergies. Does not bruise/bleed easily.   Psychiatric/Behavioral:  Negative for hallucinations and suicidal ideas. The patient is nervous/anxious.     History:   History reviewed. No pertinent past medical history.   History reviewed. No pertinent surgical history.  Family History   Problem Relation Age of Onset    Hypertension Mother     Diabetes Mother     Heart disease Mother     Heart disease Father       Social History     Tobacco Use    Smoking status: Never    Smokeless tobacco: Never   Substance and Sexual Activity    Alcohol use: Yes    Drug use: Never    Sexual activity: Yes        Allergies: Review of patient's allergies indicates:  No Known Allergies  Objective:     Vitals:    11/09/22 1209   BP: 127/73  "  Pulse: 91   Resp: 20   Temp: 99 °F (37.2 °C)   SpO2: 97%   Weight: 101.8 kg (224 lb 6.4 oz)   Height: 5' 9" (1.753 m)     Body mass index is 33.14 kg/m².     Physical Examination:   Physical Exam  Constitutional:       General: She is not in acute distress.     Appearance: Normal appearance. She is not ill-appearing.   Cardiovascular:      Rate and Rhythm: Normal rate and regular rhythm.      Heart sounds: Normal heart sounds.   Pulmonary:      Effort: Pulmonary effort is normal. No respiratory distress.      Breath sounds: Normal breath sounds.   Musculoskeletal:      Cervical back: Normal range of motion.   Skin:     General: Skin is warm and dry.   Neurological:      Mental Status: She is alert and oriented to person, place, and time.   Psychiatric:         Mood and Affect: Mood normal.         Behavior: Behavior normal.       Assessment:     Problem List Items Addressed This Visit          Psychiatric    Anxiety (Chronic)    Overview       Practice deep breathing or abdominal breathing exercises when anxiety occurs.  Exercise daily. Get sunlight daily.  Avoid caffeine, alcohol and stimulants.  Practice positive phrases and repeat throughout the day, yoga, lavender scents or Chamomile tea will help anxiety.  Set healthy boundaries, avoid people and conversations that increase stress.  Reports any symptoms of suicidal or homicidal ideations immediately, if clinic is closed go to nearest emergency room.             Current Assessment & Plan     Patient would like to try medicine, BusPar 10 mg b.i.d. sent to pharmacy.  Zofran as needed for nausea well starting medication.    Return to clinic in 1 month to assess efficacy         Relevant Medications    busPIRone (BUSPAR) 10 MG tablet       Cardiac/Vascular    History of palpitations (Chronic)       Orthopedic    Closed fracture of base of fifth metatarsal bone of left foot     Other Visit Diagnoses       Encounter for wellness examination    -  Primary    Relevant " Orders    TSH    T4, Free    Hemoglobin A1C    SYPHILIS ANTIBODY (WITH REFLEX RPR)    Hepatitis Panel, Acute    Lipid Panel    CBC Auto Differential    Comprehensive Metabolic Panel    HIV 1/2 Ag/Ab (4th Gen)    Vitamin D    Ambulatory referral/consult to Gynecology    Urinalysis, Reflex to Urine Culture Urine, Clean Catch    Nausea        Relevant Medications    ondansetron (ZOFRAN-ODT) 4 MG TbDL            Plan:   Yessy was seen today for follow-up.    Diagnoses and all orders for this visit:    Encounter for wellness examination  -     TSH  -     T4, Free  -     Hemoglobin A1C  -     SYPHILIS ANTIBODY (WITH REFLEX RPR)  -     Hepatitis Panel, Acute  -     Lipid Panel  -     CBC Auto Differential  -     Comprehensive Metabolic Panel  -     HIV 1/2 Ag/Ab (4th Gen)  -     Vitamin D  -     Ambulatory referral/consult to Gynecology; Future  -     Urinalysis, Reflex to Urine Culture Urine, Clean Catch    History of palpitations    Closed fracture of base of fifth metatarsal bone of left foot    Anxiety  -     busPIRone (BUSPAR) 10 MG tablet; Take 1 tablet (10 mg total) by mouth 2 (two) times daily.    Nausea  -     ondansetron (ZOFRAN-ODT) 4 MG TbDL; Take 1 tablet (4 mg total) by mouth 2 (two) times daily as needed (nausea).     No follow-ups on file.     This note was created with the assistance of Dragon voice recognition software or phone dictation. There may be transcription errors as a result of using this technology however minimal. Effort has been made to assure accuracy of transcription but any obvious errors or omissions should be clarified with the author of the document

## 2022-11-09 NOTE — ASSESSMENT & PLAN NOTE
Patient would like to try medicine, BusPar 10 mg b.i.d. sent to pharmacy.  Zofran as needed for nausea well starting medication.    Return to clinic in 1 month to assess efficacy

## 2022-12-01 ENCOUNTER — CLINICAL SUPPORT (OUTPATIENT)
Dept: GYNECOLOGY | Facility: CLINIC | Age: 27
End: 2022-12-01
Payer: MEDICAID

## 2022-12-01 DIAGNOSIS — Z23 NEED FOR HPV VACCINE: Primary | ICD-10-CM

## 2022-12-01 PROCEDURE — 99211 OFF/OP EST MAY X REQ PHY/QHP: CPT | Mod: PBBFAC

## 2022-12-01 PROCEDURE — 90471 IMMUNIZATION ADMIN: CPT | Mod: PBBFAC

## 2022-12-01 PROCEDURE — 90651 9VHPV VACCINE 2/3 DOSE IM: CPT | Mod: PBBFAC

## 2022-12-01 RX ADMIN — HUMAN PAPILLOMAVIRUS 9-VALENT VACCINE, RECOMBINANT 0.5 ML: 30; 40; 60; 40; 20; 20; 20; 20; 20 INJECTION, SUSPENSION INTRAMUSCULAR at 07:12

## 2022-12-01 NOTE — PROGRESS NOTES
Consent signed. 3rd dose of HPV vaccine administered per HPV Protocol. Patient tolerated well and left in stable condition. VIS given to patient upon discharge.

## 2022-12-12 ENCOUNTER — OFFICE VISIT (OUTPATIENT)
Dept: FAMILY MEDICINE | Facility: CLINIC | Age: 27
End: 2022-12-12
Payer: MEDICAID

## 2022-12-12 VITALS
WEIGHT: 230.81 LBS | HEART RATE: 95 BPM | BODY MASS INDEX: 34.18 KG/M2 | SYSTOLIC BLOOD PRESSURE: 129 MMHG | HEIGHT: 69 IN | TEMPERATURE: 98 F | DIASTOLIC BLOOD PRESSURE: 87 MMHG | RESPIRATION RATE: 18 BRPM | OXYGEN SATURATION: 98 %

## 2022-12-12 DIAGNOSIS — Z87.898 HISTORY OF PALPITATIONS: Chronic | ICD-10-CM

## 2022-12-12 DIAGNOSIS — F41.9 ANXIETY: Chronic | ICD-10-CM

## 2022-12-12 PROCEDURE — 3079F PR MOST RECENT DIASTOLIC BLOOD PRESSURE 80-89 MM HG: ICD-10-PCS | Mod: CPTII,,,

## 2022-12-12 PROCEDURE — 1159F MED LIST DOCD IN RCRD: CPT | Mod: CPTII,,,

## 2022-12-12 PROCEDURE — 3008F BODY MASS INDEX DOCD: CPT | Mod: CPTII,,,

## 2022-12-12 PROCEDURE — 3079F DIAST BP 80-89 MM HG: CPT | Mod: CPTII,,,

## 2022-12-12 PROCEDURE — 3074F PR MOST RECENT SYSTOLIC BLOOD PRESSURE < 130 MM HG: ICD-10-PCS | Mod: CPTII,,,

## 2022-12-12 PROCEDURE — 1159F PR MEDICATION LIST DOCUMENTED IN MEDICAL RECORD: ICD-10-PCS | Mod: CPTII,,,

## 2022-12-12 PROCEDURE — 99214 OFFICE O/P EST MOD 30 MIN: CPT | Mod: S$PBB,,,

## 2022-12-12 PROCEDURE — 3008F PR BODY MASS INDEX (BMI) DOCUMENTED: ICD-10-PCS | Mod: CPTII,,,

## 2022-12-12 PROCEDURE — 3074F SYST BP LT 130 MM HG: CPT | Mod: CPTII,,,

## 2022-12-12 PROCEDURE — 99213 OFFICE O/P EST LOW 20 MIN: CPT | Mod: PBBFAC,PN

## 2022-12-12 PROCEDURE — 1160F PR REVIEW ALL MEDS BY PRESCRIBER/CLIN PHARMACIST DOCUMENTED: ICD-10-PCS | Mod: CPTII,,,

## 2022-12-12 PROCEDURE — 99214 PR OFFICE/OUTPT VISIT, EST, LEVL IV, 30-39 MIN: ICD-10-PCS | Mod: S$PBB,,,

## 2022-12-12 PROCEDURE — 1160F RVW MEDS BY RX/DR IN RCRD: CPT | Mod: CPTII,,,

## 2022-12-12 RX ORDER — BUSPIRONE HYDROCHLORIDE 10 MG/1
10 TABLET ORAL 2 TIMES DAILY
Qty: 180 TABLET | Refills: 1 | Status: SHIPPED | OUTPATIENT
Start: 2022-12-12 | End: 2023-02-08 | Stop reason: SDUPTHER

## 2022-12-12 NOTE — ASSESSMENT & PLAN NOTE
Chronic issue, resolved patient is no longer experiencing palpitations.  Patient anxiety controlled.

## 2022-12-12 NOTE — PROGRESS NOTES
Patient Name: Yessy Brooks   : 1995  MRN: [id number]     Subjective:   Patient ID: Yessy Brooks is a 27 y.o. female.    Chief Complaint:   Chief Complaint   Patient presents with    Follow-up        HPI: 2022:  Patient appointment today to assess medication efficacy.  Patient was started on 10 mg of BusPar b.i.d..  Patient endorses 100% compliance with medication and states that she is feeling drastically better.  She is sleeping better and feels no panic attacks or anxiety throughout the day.  Patient is taking the medication in the morning and again in the afternoon.  Patient reports no SI/HI or episodes of mercedes.  No other acute complaints from patient today.      ROS:  Review of Systems   Constitutional:  Negative for chills, fever and weight loss.   HENT:  Negative for ear discharge, nosebleeds and tinnitus.    Eyes:  Negative for blurred vision, photophobia and pain.   Respiratory:  Negative for cough, shortness of breath, wheezing and stridor.    Cardiovascular:  Negative for chest pain, palpitations and orthopnea.   Gastrointestinal:  Negative for abdominal pain, heartburn and nausea.   Genitourinary:  Negative for dysuria, frequency, hematuria and urgency.   Musculoskeletal:  Negative for falls and myalgias.   Skin:  Negative for itching and rash.   Neurological:  Negative for dizziness, sensory change, speech change, focal weakness, seizures, weakness and headaches.   Endo/Heme/Allergies:  Negative for environmental allergies. Does not bruise/bleed easily.   Psychiatric/Behavioral:  Negative for hallucinations and suicidal ideas.     History:   History reviewed. No pertinent past medical history.   History reviewed. No pertinent surgical history.  Family History   Problem Relation Age of Onset    Hypertension Mother     Diabetes Mother     Heart disease Mother     Heart disease Father       Social History     Tobacco Use    Smoking status: Never    Smokeless tobacco: Never   Substance  "and Sexual Activity    Alcohol use: Yes    Drug use: Never    Sexual activity: Yes        Allergies: Review of patient's allergies indicates:  No Known Allergies  Objective:     Vitals:    12/12/22 0941   BP: 129/87   Pulse: 95   Resp: 18   Temp: 98.2 °F (36.8 °C)   SpO2: 98%   Weight: 104.7 kg (230 lb 12.8 oz)   Height: 5' 9" (1.753 m)     Body mass index is 34.08 kg/m².     Physical Examination:   Physical Exam  Constitutional:       General: She is not in acute distress.     Appearance: Normal appearance. She is not ill-appearing.   Cardiovascular:      Rate and Rhythm: Normal rate and regular rhythm.      Heart sounds: Normal heart sounds.   Pulmonary:      Effort: Pulmonary effort is normal. No respiratory distress.      Breath sounds: Normal breath sounds.   Musculoskeletal:      Cervical back: Normal range of motion.   Skin:     General: Skin is warm and dry.   Neurological:      Mental Status: She is alert and oriented to person, place, and time.   Psychiatric:         Mood and Affect: Mood normal.         Behavior: Behavior normal.       Assessment:     Problem List Items Addressed This Visit          Psychiatric    Anxiety (Chronic)    Overview       Practice deep breathing or abdominal breathing exercises when anxiety occurs.  Exercise daily. Get sunlight daily.  Avoid caffeine, alcohol and stimulants.  Practice positive phrases and repeat throughout the day, yoga, lavender scents or Chamomile tea will help anxiety.  Set healthy boundaries, avoid people and conversations that increase stress.  Reports any symptoms of suicidal or homicidal ideations immediately, if clinic is closed go to nearest emergency room.             Current Assessment & Plan     Chronic issue, currently controlled with 10 mg of BusPar.  Patient is taking in the morning and then again in the afternoon.  She will update clinic if she needs increase in medication prior to her appointment.         Relevant Medications    busPIRone " (BUSPAR) 10 MG tablet       Cardiac/Vascular    History of palpitations (Chronic)    Current Assessment & Plan     Chronic issue, resolved patient is no longer experiencing palpitations.  Patient anxiety controlled.            Plan:   Yessy was seen today for follow-up.    Diagnoses and all orders for this visit:    Anxiety  -     busPIRone (BUSPAR) 10 MG tablet; Take 1 tablet (10 mg total) by mouth 2 (two) times daily.    History of palpitations       Follow up in about 3 months (around 3/12/2023) for Virtual Visit, KEVEN/PHQ.     This note was created with the assistance of Dragon voice recognition software or phone dictation. There may be transcription errors as a result of using this technology however minimal. Effort has been made to assure accuracy of transcription but any obvious errors or omissions should be clarified with the author of the document

## 2022-12-12 NOTE — ASSESSMENT & PLAN NOTE
Chronic issue, currently controlled with 10 mg of BusPar.  Patient is taking in the morning and then again in the afternoon.  She will update clinic if she needs increase in medication prior to her appointment.

## 2023-02-08 ENCOUNTER — OFFICE VISIT (OUTPATIENT)
Dept: FAMILY MEDICINE | Facility: CLINIC | Age: 28
End: 2023-02-08
Payer: MEDICAID

## 2023-02-08 VITALS
HEART RATE: 91 BPM | WEIGHT: 234.19 LBS | SYSTOLIC BLOOD PRESSURE: 127 MMHG | OXYGEN SATURATION: 95 % | HEIGHT: 69 IN | DIASTOLIC BLOOD PRESSURE: 81 MMHG | TEMPERATURE: 100 F | BODY MASS INDEX: 34.69 KG/M2 | RESPIRATION RATE: 18 BRPM

## 2023-02-08 DIAGNOSIS — Z91.09 ENVIRONMENTAL ALLERGIES: Primary | ICD-10-CM

## 2023-02-08 DIAGNOSIS — F41.9 ANXIETY: Chronic | ICD-10-CM

## 2023-02-08 PROCEDURE — 99213 OFFICE O/P EST LOW 20 MIN: CPT | Mod: PBBFAC,PN

## 2023-02-08 PROCEDURE — 3074F SYST BP LT 130 MM HG: CPT | Mod: CPTII,,,

## 2023-02-08 PROCEDURE — 1159F MED LIST DOCD IN RCRD: CPT | Mod: CPTII,,,

## 2023-02-08 PROCEDURE — 99214 OFFICE O/P EST MOD 30 MIN: CPT | Mod: S$PBB,,,

## 2023-02-08 PROCEDURE — 3008F BODY MASS INDEX DOCD: CPT | Mod: CPTII,,,

## 2023-02-08 PROCEDURE — 1160F RVW MEDS BY RX/DR IN RCRD: CPT | Mod: CPTII,,,

## 2023-02-08 PROCEDURE — 3074F PR MOST RECENT SYSTOLIC BLOOD PRESSURE < 130 MM HG: ICD-10-PCS | Mod: CPTII,,,

## 2023-02-08 PROCEDURE — 99214 PR OFFICE/OUTPT VISIT, EST, LEVL IV, 30-39 MIN: ICD-10-PCS | Mod: S$PBB,,,

## 2023-02-08 PROCEDURE — 3079F PR MOST RECENT DIASTOLIC BLOOD PRESSURE 80-89 MM HG: ICD-10-PCS | Mod: CPTII,,,

## 2023-02-08 PROCEDURE — 3079F DIAST BP 80-89 MM HG: CPT | Mod: CPTII,,,

## 2023-02-08 PROCEDURE — 1159F PR MEDICATION LIST DOCUMENTED IN MEDICAL RECORD: ICD-10-PCS | Mod: CPTII,,,

## 2023-02-08 PROCEDURE — 1160F PR REVIEW ALL MEDS BY PRESCRIBER/CLIN PHARMACIST DOCUMENTED: ICD-10-PCS | Mod: CPTII,,,

## 2023-02-08 PROCEDURE — 3008F PR BODY MASS INDEX (BMI) DOCUMENTED: ICD-10-PCS | Mod: CPTII,,,

## 2023-02-08 RX ORDER — FLUTICASONE PROPIONATE 50 MCG
1 SPRAY, SUSPENSION (ML) NASAL DAILY
Qty: 18.2 ML | Refills: 3 | Status: SHIPPED | OUTPATIENT
Start: 2023-02-08

## 2023-02-08 RX ORDER — LORATADINE 10 MG/1
10 TABLET ORAL DAILY
Qty: 30 TABLET | Refills: 3 | Status: SHIPPED | OUTPATIENT
Start: 2023-02-08 | End: 2023-07-20 | Stop reason: SDUPTHER

## 2023-02-08 RX ORDER — BUSPIRONE HYDROCHLORIDE 10 MG/1
10 TABLET ORAL 3 TIMES DAILY
Qty: 270 TABLET | Refills: 1 | Status: SHIPPED | OUTPATIENT
Start: 2023-02-08 | End: 2023-03-14 | Stop reason: SDUPTHER

## 2023-02-08 NOTE — PROGRESS NOTES
Patient Name: Yessy Brooks   : 1995  MRN: [id number]     Subjective:   Patient ID: Yessy Brooks is a 27 y.o. female.    Chief Complaint:   Chief Complaint   Patient presents with    Follow-up     C/O cough         HPI: 2023:  patient presents to clinic today requesting to increase anxiety medication.  Patient has had drastic improvement in anxiety since starting medication but would like to try taking it 3 times daily.  The recent to pharmacy for patient.  Patient also complaining of environmental allergies causing her to have cough.  Patient had flu about 2 weeks ago and it is just been having difficult time getting over symptoms.  Patient denies any sputum, no chest pain or shortness a breath noted.      ROS:  Review of Systems   Constitutional:  Negative for chills, fever and weight loss.   HENT:  Negative for ear discharge, nosebleeds and tinnitus.    Eyes:  Negative for blurred vision, photophobia and pain.   Respiratory:  Positive for cough. Negative for sputum production, shortness of breath, wheezing and stridor.    Cardiovascular:  Negative for chest pain, palpitations and orthopnea.   Gastrointestinal:  Negative for abdominal pain, heartburn and nausea.   Genitourinary:  Negative for dysuria, frequency, hematuria and urgency.   Musculoskeletal:  Negative for falls and myalgias.   Skin:  Negative for itching and rash.   Neurological:  Negative for dizziness, sensory change, speech change, focal weakness, seizures, weakness and headaches.   Endo/Heme/Allergies:  Negative for environmental allergies. Does not bruise/bleed easily.   Psychiatric/Behavioral:  Negative for hallucinations and suicidal ideas.     History:   History reviewed. No pertinent past medical history.   History reviewed. No pertinent surgical history.  Family History   Problem Relation Age of Onset    Hypertension Mother     Diabetes Mother     Heart disease Mother     Heart disease Father       Social History  "    Tobacco Use    Smoking status: Never    Smokeless tobacco: Never   Substance and Sexual Activity    Alcohol use: Yes    Drug use: Never    Sexual activity: Yes        Allergies: Review of patient's allergies indicates:  No Known Allergies  Objective:     Vitals:    02/08/23 1330   BP: 127/81   Pulse: 91   Resp: 18   Temp: 99.5 °F (37.5 °C)   SpO2: 95%   Weight: 106.2 kg (234 lb 3.2 oz)   Height: 5' 9" (1.753 m)     Body mass index is 34.59 kg/m².     Physical Examination:   Physical Exam  Constitutional:       General: She is not in acute distress.     Appearance: Normal appearance. She is not ill-appearing.   Cardiovascular:      Rate and Rhythm: Normal rate and regular rhythm.      Heart sounds: Normal heart sounds.   Pulmonary:      Effort: Pulmonary effort is normal. No respiratory distress.      Breath sounds: Normal breath sounds.   Musculoskeletal:      Cervical back: Normal range of motion.   Skin:     General: Skin is warm and dry.   Neurological:      Mental Status: She is alert and oriented to person, place, and time.   Psychiatric:         Mood and Affect: Mood normal.         Behavior: Behavior normal.       Assessment:     Problem List Items Addressed This Visit          Psychiatric    Anxiety (Chronic)    Overview       Practice deep breathing or abdominal breathing exercises when anxiety occurs.  Exercise daily. Get sunlight daily.  Avoid caffeine, alcohol and stimulants.  Practice positive phrases and repeat throughout the day, yoga, lavender scents or Chamomile tea will help anxiety.  Set healthy boundaries, avoid people and conversations that increase stress.  Reports any symptoms of suicidal or homicidal ideations immediately, if clinic is closed go to nearest emergency room.             Relevant Medications    busPIRone (BUSPAR) 10 MG tablet     Other Visit Diagnoses       Environmental allergies    -  Primary    Relevant Medications    fluticasone propionate (FLONASE) 50 mcg/actuation nasal " spray    loratadine (CLARITIN) 10 mg tablet            Plan:   Yessy was seen today for follow-up.    Diagnoses and all orders for this visit:    Environmental allergies  -     fluticasone propionate (FLONASE) 50 mcg/actuation nasal spray; 1 spray (50 mcg total) by Each Nostril route once daily.  -     loratadine (CLARITIN) 10 mg tablet; Take 1 tablet (10 mg total) by mouth once daily.    Anxiety  -     busPIRone (BUSPAR) 10 MG tablet; Take 1 tablet (10 mg total) by mouth 3 (three) times daily.       Follow up for  med change follow-up with virtual visit in March, otherwise patient  six-month for blood work.     This note was created with the assistance of Dragon voice recognition software or phone dictation. There may be transcription errors as a result of using this technology however minimal. Effort has been made to assure accuracy of transcription but any obvious errors or omissions should be clarified with the author of the document

## 2023-03-14 ENCOUNTER — OFFICE VISIT (OUTPATIENT)
Dept: FAMILY MEDICINE | Facility: CLINIC | Age: 28
End: 2023-03-14
Payer: MEDICAID

## 2023-03-14 DIAGNOSIS — F41.9 ANXIETY: Primary | Chronic | ICD-10-CM

## 2023-03-14 PROCEDURE — 1160F PR REVIEW ALL MEDS BY PRESCRIBER/CLIN PHARMACIST DOCUMENTED: ICD-10-PCS | Mod: CPTII,95,,

## 2023-03-14 PROCEDURE — 99213 OFFICE O/P EST LOW 20 MIN: CPT | Mod: 95,,,

## 2023-03-14 PROCEDURE — 1159F MED LIST DOCD IN RCRD: CPT | Mod: CPTII,95,,

## 2023-03-14 PROCEDURE — 1160F RVW MEDS BY RX/DR IN RCRD: CPT | Mod: CPTII,95,,

## 2023-03-14 PROCEDURE — 1159F PR MEDICATION LIST DOCUMENTED IN MEDICAL RECORD: ICD-10-PCS | Mod: CPTII,95,,

## 2023-03-14 PROCEDURE — 99213 PR OFFICE/OUTPT VISIT, EST, LEVL III, 20-29 MIN: ICD-10-PCS | Mod: 95,,,

## 2023-03-14 RX ORDER — BUSPIRONE HYDROCHLORIDE 10 MG/1
10 TABLET ORAL 2 TIMES DAILY
Qty: 180 TABLET | Refills: 1 | Status: SHIPPED | OUTPATIENT
Start: 2023-03-14 | End: 2023-07-20 | Stop reason: SDUPTHER

## 2023-03-14 NOTE — PROGRESS NOTES
Audio Only Telehealth Visit     The patient location is: [place]  The chief complaint leading to consultation is: anxiety  Visit type: Virtual visit with audio only (telephone)  Total time spent with patient: 30 min     The reason for the audio only service rather than synchronous audio and video virtual visit was related to technical difficulties or patient preference/necessity.     Each patient to whom I provide medical services by telemedicine is:  (1) informed of the relationship between the physician and patient and the respective role of any other health care provider with respect to management of the patient; and (2) notified that they may decline to receive medical services by telemedicine and may withdraw from such care at any time. Patient verbally consented to receive this service via voice-only telephone call.    Patient Name: Yessy Brooks   : 1995  MRN: [id number]     Subjective:   Patient ID: Yessy Brooks is a 27 y.o. female.    Chief Complaint:   Chief Complaint   Patient presents with    Follow-up     Med change f/u         HPI: 2023:  Virtual visit with patient today to address patient's anxiety, patient has been taking BusPar twice a day once in the morning and again in the afternoon and states that she has noticed drastic improvement in her anxiety.  Patient denies SI/HI, episodes of mercedes.  Patient denies any fatigue or grogginess associated with medication.  Patient denies any anxiety.    2023:  patient presents to clinic today requesting to increase anxiety medication.  Patient has had drastic improvement in anxiety since starting medication but would like to try taking it 3 times daily.  The recent to pharmacy for patient.  Patient also complaining of environmental allergies causing her to have cough.  Patient had flu about 2 weeks ago and it is just been having difficult time getting over symptoms.  Patient denies any sputum, no chest pain or shortness a breath  noted.      ROS:  Review of Systems   Constitutional:  Negative for chills, fever and weight loss.   HENT:  Negative for ear discharge, nosebleeds and tinnitus.    Eyes:  Negative for blurred vision, photophobia and pain.   Respiratory:  Negative for cough, shortness of breath, wheezing and stridor.    Cardiovascular:  Negative for chest pain, palpitations and orthopnea.   Gastrointestinal:  Negative for abdominal pain, heartburn and nausea.   Genitourinary:  Negative for dysuria, frequency, hematuria and urgency.   Musculoskeletal:  Negative for falls and myalgias.   Skin:  Negative for itching and rash.   Neurological:  Negative for dizziness, sensory change, speech change, focal weakness, seizures, weakness and headaches.   Endo/Heme/Allergies:  Negative for environmental allergies. Does not bruise/bleed easily.   Psychiatric/Behavioral:  Negative for hallucinations and suicidal ideas.     History:   History reviewed. No pertinent past medical history.   History reviewed. No pertinent surgical history.  Family History   Problem Relation Age of Onset    Hypertension Mother     Diabetes Mother     Heart disease Mother     Heart disease Father       Social History     Tobacco Use    Smoking status: Never    Smokeless tobacco: Never   Substance and Sexual Activity    Alcohol use: Yes    Drug use: Never    Sexual activity: Yes        Allergies: Review of patient's allergies indicates:  No Known Allergies  Objective:   There were no vitals filed for this visit.  There is no height or weight on file to calculate BMI.     Physical Examination:   Physical Exam  Constitutional:       General: She is not in acute distress.     Comments: Limited Physical Exam due to telemedicine visit   Pulmonary:      Effort: Pulmonary effort is normal. No respiratory distress.   Neurological:      Mental Status: She is alert.   Psychiatric:         Mood and Affect: Mood normal.         Behavior: Behavior normal.       Assessment:      Problem List Items Addressed This Visit          Psychiatric    Anxiety - Primary (Chronic)    Overview       Practice deep breathing or abdominal breathing exercises when anxiety occurs.  Exercise daily. Get sunlight daily.  Avoid caffeine, alcohol and stimulants.  Practice positive phrases and repeat throughout the day, yoga, lavender scents or Chamomile tea will help anxiety.  Set healthy boundaries, avoid people and conversations that increase stress.  Reports any symptoms of suicidal or homicidal ideations immediately, if clinic is closed go to nearest emergency room.             Current Assessment & Plan     Buspar 10mg BID has proven to be effective dose for patient. Chronic stable issue.           Relevant Medications    busPIRone (BUSPAR) 10 MG tablet       Plan:   Yessy was seen today for follow-up.    Diagnoses and all orders for this visit:    Anxiety  -     busPIRone (BUSPAR) 10 MG tablet; Take 1 tablet (10 mg total) by mouth 2 (two) times daily.       Follow up in about 4 months (around 7/14/2023) for annual wellness labs.       This note was created with the assistance of a voice recognition software or phone dictation. There may be transcription errors as a result of using this technology however minimal. Effort has been made to assure accuracy of transcription but any obvious errors or omissions should be clarified with the author of the document      This service was not originating from a related E/M service provided within the previous 7 days nor will  to an E/M service or procedure within the next 24 hours or my soonest available appointment.  Prevailing standard of care was able to be met in this audio-only visit.   \  I spent a total of 30 min minutes on the day of the visit.This includes face to face time and non-face to face time preparing to see the patient (eg, review of tests), obtaining and/or reviewing separately obtained history, documenting clinical information in the  electronic or other health record, independently interpreting results and communicating results to the patient/family/caregiver, or care coordinator.

## 2023-07-20 ENCOUNTER — OFFICE VISIT (OUTPATIENT)
Dept: FAMILY MEDICINE | Facility: CLINIC | Age: 28
End: 2023-07-20
Payer: MEDICAID

## 2023-07-20 VITALS
HEART RATE: 84 BPM | RESPIRATION RATE: 20 BRPM | SYSTOLIC BLOOD PRESSURE: 116 MMHG | HEIGHT: 69 IN | BODY MASS INDEX: 35.09 KG/M2 | OXYGEN SATURATION: 98 % | WEIGHT: 236.88 LBS | DIASTOLIC BLOOD PRESSURE: 78 MMHG | TEMPERATURE: 98 F

## 2023-07-20 DIAGNOSIS — F41.9 ANXIETY: Chronic | ICD-10-CM

## 2023-07-20 DIAGNOSIS — Z91.09 ENVIRONMENTAL ALLERGIES: ICD-10-CM

## 2023-07-20 PROCEDURE — 3074F SYST BP LT 130 MM HG: CPT | Mod: CPTII,,,

## 2023-07-20 PROCEDURE — 3078F PR MOST RECENT DIASTOLIC BLOOD PRESSURE < 80 MM HG: ICD-10-PCS | Mod: CPTII,,,

## 2023-07-20 PROCEDURE — 99214 OFFICE O/P EST MOD 30 MIN: CPT | Mod: S$PBB,,,

## 2023-07-20 PROCEDURE — 3008F PR BODY MASS INDEX (BMI) DOCUMENTED: ICD-10-PCS | Mod: CPTII,,,

## 2023-07-20 PROCEDURE — 1159F MED LIST DOCD IN RCRD: CPT | Mod: CPTII,,,

## 2023-07-20 PROCEDURE — 3078F DIAST BP <80 MM HG: CPT | Mod: CPTII,,,

## 2023-07-20 PROCEDURE — 1160F RVW MEDS BY RX/DR IN RCRD: CPT | Mod: CPTII,,,

## 2023-07-20 PROCEDURE — 1159F PR MEDICATION LIST DOCUMENTED IN MEDICAL RECORD: ICD-10-PCS | Mod: CPTII,,,

## 2023-07-20 PROCEDURE — 3074F PR MOST RECENT SYSTOLIC BLOOD PRESSURE < 130 MM HG: ICD-10-PCS | Mod: CPTII,,,

## 2023-07-20 PROCEDURE — 99213 OFFICE O/P EST LOW 20 MIN: CPT | Mod: PBBFAC,PN

## 2023-07-20 PROCEDURE — 99214 PR OFFICE/OUTPT VISIT, EST, LEVL IV, 30-39 MIN: ICD-10-PCS | Mod: S$PBB,,,

## 2023-07-20 PROCEDURE — 3008F BODY MASS INDEX DOCD: CPT | Mod: CPTII,,,

## 2023-07-20 PROCEDURE — 1160F PR REVIEW ALL MEDS BY PRESCRIBER/CLIN PHARMACIST DOCUMENTED: ICD-10-PCS | Mod: CPTII,,,

## 2023-07-20 RX ORDER — LORATADINE 10 MG/1
10 TABLET ORAL DAILY
Qty: 30 TABLET | Refills: 3 | Status: SHIPPED | OUTPATIENT
Start: 2023-07-20

## 2023-07-20 RX ORDER — BUSPIRONE HYDROCHLORIDE 10 MG/1
10 TABLET ORAL 2 TIMES DAILY
Qty: 180 TABLET | Refills: 1 | Status: SHIPPED | OUTPATIENT
Start: 2023-07-20 | End: 2023-08-08 | Stop reason: SDUPTHER
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
## 2023-07-20 NOTE — PROGRESS NOTES
Patient Name: Yessy Brooks     : 1995    MRN: [id number]     Subjective:     Patient ID: Yessy Brooks is a 27 y.o. female.    Chief Complaint:   Chief Complaint   Patient presents with    Follow-up     Needs a note for school r/t medication and financial aid.         HPI: 2023:  Patient reports the clinic today over concerns of issues with her school performance.  Patient went through long trial period of medication to help control her anxiety and depression.  Due to this patient had decreased performance in school and is requiring formal letter stating that she was being medically treated during this semester to assist with financial aid.  Letter provided to patient and they may contact me for any further questions or concerns.  Patient anxiety is currently stable and controlled, continue current medication.  Denies SI/HI, mercedes or hallucinations.    2023:  Virtual visit with patient today to address patient's anxiety, patient has been taking BusPar twice a day once in the morning and again in the afternoon and states that she has noticed drastic improvement in her anxiety.  Patient denies SI/HI, episodes of mercedes.  Patient denies any fatigue or grogginess associated with medication.  Patient denies any anxiety.    2023:  patient presents to clinic today requesting to increase anxiety medication.  Patient has had drastic improvement in anxiety since starting medication but would like to try taking it 3 times daily.  The recent to pharmacy for patient.  Patient also complaining of environmental allergies causing her to have cough.  Patient had flu about 2 weeks ago and it is just been having difficult time getting over symptoms.  Patient denies any sputum, no chest pain or shortness a breath noted.      ROS:      ROS     12 point review of systems conducted, negative except as stated in the history of present illness. See HPI for details.    History:     History reviewed. No  "pertinent past medical history.     History reviewed. No pertinent surgical history.    Family History   Problem Relation Age of Onset    Hypertension Mother     Diabetes Mother     Heart disease Mother     Heart disease Father         Social History     Tobacco Use    Smoking status: Never    Smokeless tobacco: Never   Substance and Sexual Activity    Alcohol use: Yes     Comment: occ    Drug use: Not Currently    Sexual activity: Yes       Current Outpatient Medications   Medication Instructions    busPIRone (BUSPAR) 10 mg, Oral, 2 times daily    fluticasone propionate (FLONASE) 50 mcg, Each Nostril, Daily    loratadine (CLARITIN) 10 mg, Oral, Daily    ondansetron (ZOFRAN-ODT) 4 mg, Oral, 2 times daily PRN        Review of patient's allergies indicates:  No Known Allergies    Objective:     Visit Vitals  /78 (BP Location: Right arm, Patient Position: Sitting)   Pulse 84   Temp 98 °F (36.7 °C) (Oral)   Resp 20   Ht 5' 9" (1.753 m)   Wt 107.5 kg (236 lb 14.4 oz)   LMP  (LMP Unknown)   SpO2 98%   BMI 34.98 kg/m²       Physical Examination:     Physical Exam  Vitals reviewed.   Constitutional:       Appearance: Normal appearance. She is normal weight.   HENT:      Head: Normocephalic.      Right Ear: Tympanic membrane, ear canal and external ear normal.      Left Ear: Tympanic membrane, ear canal and external ear normal.      Nose: Nose normal.      Mouth/Throat:      Mouth: Mucous membranes are moist.      Pharynx: Oropharynx is clear.   Eyes:      Extraocular Movements: Extraocular movements intact.      Conjunctiva/sclera: Conjunctivae normal.      Pupils: Pupils are equal, round, and reactive to light.   Cardiovascular:      Rate and Rhythm: Normal rate and regular rhythm.      Pulses: Normal pulses.      Heart sounds: Normal heart sounds.   Pulmonary:      Effort: Pulmonary effort is normal.      Breath sounds: Normal breath sounds.   Abdominal:      General: Abdomen is flat. Bowel sounds are normal.      " Palpations: Abdomen is soft.   Musculoskeletal:         General: Normal range of motion.      Cervical back: Normal range of motion and neck supple.   Skin:     General: Skin is warm and dry.   Neurological:      General: No focal deficit present.      Mental Status: She is alert and oriented to person, place, and time.   Psychiatric:         Mood and Affect: Mood normal.         Behavior: Behavior normal.       Lab Results:     Chemistry:  Lab Results   Component Value Date     01/24/2022    K 4.7 01/24/2022    CHLORIDE 106 01/24/2022    BUN 13.4 01/24/2022    CREATININE 0.79 01/24/2022    GLUCOSE 84 01/24/2022    CALCIUM 10.1 01/24/2022    ALKPHOS 58 01/24/2022    LABPROT 7.8 01/24/2022    ALBUMIN 4.5 01/24/2022    BILIDIR 0.2 01/24/2022    IBILI 0.50 01/24/2022    AST 17 01/24/2022    ALT 23 01/24/2022    TSH 2.2431 01/24/2022    KUYMLS0VVAF 0.88 01/24/2022        Lab Results   Component Value Date    HGBA1C 5.2 01/24/2022        Hematology:  Lab Results   Component Value Date    WBC 7.7 01/24/2022    HGB 15.3 01/24/2022    HCT 47.3 (H) 01/24/2022     01/24/2022       Lipid Panel:  No results found for: CHOL, HDL, LDL, TRIG, TOTALCHOLEST     Urine:  No results found for: COLORUA, APPEARANCEUA, SGUA, PHUA, PROTEINUA, GLUCOSEUA, KETONESUA, BLOODUA, NITRITESUA, LEUKOCYTESUR, RBCUA, WBCUA, BACTERIA, SQEPUA, HYALINECASTS, CREATRANDUR, PROTEINURINE, UPROTCREA     Assessment:          ICD-10-CM ICD-9-CM   1. Environmental allergies  Z91.09 V15.09   2. Anxiety  F41.9 300.00        Plan:     1. Environmental allergies  -     loratadine (CLARITIN) 10 mg tablet; Take 1 tablet (10 mg total) by mouth once daily.  Dispense: 30 tablet; Refill: 3    2. Anxiety  Overview:    Practice deep breathing or abdominal breathing exercises when anxiety occurs.  Exercise daily. Get sunlight daily.  Avoid caffeine, alcohol and stimulants.  Practice positive phrases and repeat throughout the day, yoga, lavender scents or  Chamomile tea will help anxiety.  Set healthy boundaries, avoid people and conversations that increase stress.  Reports any symptoms of suicidal or homicidal ideations immediately, if clinic is closed go to nearest emergency room.        Assessment & Plan:  Chronic, stable and controlled issue    Orders:  -     busPIRone (BUSPAR) 10 MG tablet; Take 1 tablet (10 mg total) by mouth 2 (two) times daily.  Dispense: 180 tablet; Refill: 1         Follow up for keep follow up on 8/8/2023 for wellness labs.    Future Appointments   Date Time Provider Department Center   8/8/2023  9:30 AM SHERWIN Manriquez Replaced by [facility]        Carmel Miller NP

## 2023-08-08 ENCOUNTER — OFFICE VISIT (OUTPATIENT)
Dept: FAMILY MEDICINE | Facility: CLINIC | Age: 28
End: 2023-08-08
Payer: MEDICAID

## 2023-08-08 VITALS
BODY MASS INDEX: 35.06 KG/M2 | SYSTOLIC BLOOD PRESSURE: 122 MMHG | RESPIRATION RATE: 18 BRPM | TEMPERATURE: 99 F | WEIGHT: 236.69 LBS | HEIGHT: 69 IN | HEART RATE: 68 BPM | OXYGEN SATURATION: 98 % | DIASTOLIC BLOOD PRESSURE: 82 MMHG

## 2023-08-08 DIAGNOSIS — Z00.00 ENCOUNTER FOR WELLNESS EXAMINATION: Primary | ICD-10-CM

## 2023-08-08 DIAGNOSIS — F41.9 ANXIETY: Chronic | ICD-10-CM

## 2023-08-08 LAB
ALBUMIN SERPL-MCNC: 4.2 G/DL (ref 3.5–5)
ALBUMIN/GLOB SERPL: 1.2 RATIO (ref 1.1–2)
ALP SERPL-CCNC: 54 UNIT/L (ref 40–150)
ALT SERPL-CCNC: 31 UNIT/L (ref 0–55)
APPEARANCE UR: ABNORMAL
AST SERPL-CCNC: 20 UNIT/L (ref 5–34)
BACTERIA #/AREA URNS AUTO: ABNORMAL /HPF
BASOPHILS # BLD AUTO: 0.07 X10(3)/MCL
BASOPHILS NFR BLD AUTO: 0.8 %
BILIRUB SERPL-MCNC: 0.6 MG/DL
BILIRUB UR QL STRIP.AUTO: NEGATIVE
BUN SERPL-MCNC: 12.9 MG/DL (ref 7–18.7)
CALCIUM SERPL-MCNC: 9.4 MG/DL (ref 8.4–10.2)
CASTS URNS MICRO: ABNORMAL /LPF
CHLORIDE SERPL-SCNC: 108 MMOL/L (ref 98–107)
CHOLEST SERPL-MCNC: 202 MG/DL
CHOLEST/HDLC SERPL: 6 {RATIO} (ref 0–5)
CO2 SERPL-SCNC: 21 MMOL/L (ref 22–29)
COLOR UR: YELLOW
CREAT SERPL-MCNC: 0.81 MG/DL (ref 0.55–1.02)
DEPRECATED CALCIDIOL+CALCIFEROL SERPL-MC: 34.4 NG/ML (ref 30–80)
EOSINOPHIL # BLD AUTO: 0.3 X10(3)/MCL (ref 0–0.9)
EOSINOPHIL NFR BLD AUTO: 3.3 %
ERYTHROCYTE [DISTWIDTH] IN BLOOD BY AUTOMATED COUNT: 12.4 % (ref 11.5–17)
EST. AVERAGE GLUCOSE BLD GHB EST-MCNC: 102.5 MG/DL
GFR SERPLBLD CREATININE-BSD FMLA CKD-EPI: >60 MLS/MIN/1.73/M2
GLOBULIN SER-MCNC: 3.4 GM/DL (ref 2.4–3.5)
GLUCOSE SERPL-MCNC: 77 MG/DL (ref 74–100)
GLUCOSE UR QL STRIP.AUTO: NORMAL
HBA1C MFR BLD: 5.2 %
HCT VFR BLD AUTO: 46 % (ref 37–47)
HDLC SERPL-MCNC: 33 MG/DL (ref 35–60)
HGB BLD-MCNC: 15.1 G/DL (ref 12–16)
HYALINE CASTS #/AREA URNS LPF: ABNORMAL /LPF
IMM GRANULOCYTES # BLD AUTO: 0.05 X10(3)/MCL (ref 0–0.04)
IMM GRANULOCYTES NFR BLD AUTO: 0.5 %
KETONES UR QL STRIP.AUTO: NEGATIVE
LDLC SERPL CALC-MCNC: 132 MG/DL (ref 50–140)
LEUKOCYTE ESTERASE UR QL STRIP.AUTO: 25
LYMPHOCYTES # BLD AUTO: 3.37 X10(3)/MCL (ref 0.6–4.6)
LYMPHOCYTES NFR BLD AUTO: 36.6 %
MCH RBC QN AUTO: 28.8 PG (ref 27–31)
MCHC RBC AUTO-ENTMCNC: 32.8 G/DL (ref 33–36)
MCV RBC AUTO: 87.8 FL (ref 80–94)
MONOCYTES # BLD AUTO: 0.57 X10(3)/MCL (ref 0.1–1.3)
MONOCYTES NFR BLD AUTO: 6.2 %
MUCOUS THREADS URNS QL MICRO: ABNORMAL /LPF
NEUTROPHILS # BLD AUTO: 4.86 X10(3)/MCL (ref 2.1–9.2)
NEUTROPHILS NFR BLD AUTO: 52.6 %
NITRITE UR QL STRIP.AUTO: ABNORMAL
NRBC BLD AUTO-RTO: 0 %
PH UR STRIP.AUTO: 5.5 [PH]
PLATELET # BLD AUTO: 303 X10(3)/MCL (ref 130–400)
PMV BLD AUTO: 9.8 FL (ref 7.4–10.4)
POTASSIUM SERPL-SCNC: 4.3 MMOL/L (ref 3.5–5.1)
PROT SERPL-MCNC: 7.6 GM/DL (ref 6.4–8.3)
PROT UR QL STRIP.AUTO: ABNORMAL
RBC # BLD AUTO: 5.24 X10(6)/MCL (ref 4.2–5.4)
RBC #/AREA URNS AUTO: ABNORMAL /HPF
RBC UR QL AUTO: ABNORMAL
SODIUM SERPL-SCNC: 139 MMOL/L (ref 136–145)
SP GR UR STRIP.AUTO: 1.03
SQUAMOUS #/AREA URNS LPF: ABNORMAL /HPF
T4 FREE SERPL-MCNC: 0.9 NG/DL (ref 0.7–1.48)
TRIGL SERPL-MCNC: 187 MG/DL (ref 37–140)
TSH SERPL-ACNC: 2.9 UIU/ML (ref 0.35–4.94)
UROBILINOGEN UR STRIP-ACNC: NORMAL
VLDLC SERPL CALC-MCNC: 37 MG/DL
WBC # SPEC AUTO: 9.22 X10(3)/MCL (ref 4.5–11.5)
WBC #/AREA URNS AUTO: ABNORMAL /HPF

## 2023-08-08 PROCEDURE — 3079F PR MOST RECENT DIASTOLIC BLOOD PRESSURE 80-89 MM HG: ICD-10-PCS | Mod: CPTII,,,

## 2023-08-08 PROCEDURE — 1160F PR REVIEW ALL MEDS BY PRESCRIBER/CLIN PHARMACIST DOCUMENTED: ICD-10-PCS | Mod: CPTII,,,

## 2023-08-08 PROCEDURE — 82306 VITAMIN D 25 HYDROXY: CPT

## 2023-08-08 PROCEDURE — 3074F PR MOST RECENT SYSTOLIC BLOOD PRESSURE < 130 MM HG: ICD-10-PCS | Mod: CPTII,,,

## 2023-08-08 PROCEDURE — 3079F DIAST BP 80-89 MM HG: CPT | Mod: CPTII,,,

## 2023-08-08 PROCEDURE — 84439 ASSAY OF FREE THYROXINE: CPT

## 2023-08-08 PROCEDURE — 83036 HEMOGLOBIN GLYCOSYLATED A1C: CPT

## 2023-08-08 PROCEDURE — 81001 URINALYSIS AUTO W/SCOPE: CPT

## 2023-08-08 PROCEDURE — 1159F PR MEDICATION LIST DOCUMENTED IN MEDICAL RECORD: ICD-10-PCS | Mod: CPTII,,,

## 2023-08-08 PROCEDURE — 1159F MED LIST DOCD IN RCRD: CPT | Mod: CPTII,,,

## 2023-08-08 PROCEDURE — 3008F PR BODY MASS INDEX (BMI) DOCUMENTED: ICD-10-PCS | Mod: CPTII,,,

## 2023-08-08 PROCEDURE — 1160F RVW MEDS BY RX/DR IN RCRD: CPT | Mod: CPTII,,,

## 2023-08-08 PROCEDURE — 80053 COMPREHEN METABOLIC PANEL: CPT

## 2023-08-08 PROCEDURE — 85025 COMPLETE CBC W/AUTO DIFF WBC: CPT

## 2023-08-08 PROCEDURE — 80061 LIPID PANEL: CPT

## 2023-08-08 PROCEDURE — 99214 OFFICE O/P EST MOD 30 MIN: CPT | Mod: S$PBB,,,

## 2023-08-08 PROCEDURE — 84443 ASSAY THYROID STIM HORMONE: CPT

## 2023-08-08 PROCEDURE — 3008F BODY MASS INDEX DOCD: CPT | Mod: CPTII,,,

## 2023-08-08 PROCEDURE — 99214 PR OFFICE/OUTPT VISIT, EST, LEVL IV, 30-39 MIN: ICD-10-PCS | Mod: S$PBB,,,

## 2023-08-08 PROCEDURE — 36415 COLL VENOUS BLD VENIPUNCTURE: CPT

## 2023-08-08 PROCEDURE — 3074F SYST BP LT 130 MM HG: CPT | Mod: CPTII,,,

## 2023-08-08 PROCEDURE — 99213 OFFICE O/P EST LOW 20 MIN: CPT | Mod: PBBFAC,PN

## 2023-08-08 RX ORDER — BUSPIRONE HYDROCHLORIDE 10 MG/1
10 TABLET ORAL 2 TIMES DAILY
Qty: 180 TABLET | Refills: 3 | Status: SHIPPED | OUTPATIENT
Start: 2023-08-08 | End: 2023-10-11 | Stop reason: SDUPTHER

## 2023-08-08 NOTE — ASSESSMENT & PLAN NOTE
Chronic, stable.  Continue BuSpar 10 mg b.i.d..  Patient to keep close assessment of this condition over the next year.

## 2023-08-08 NOTE — PROGRESS NOTES
Patient Name: Yessy Brooks     : 1995    MRN: [id number]     Subjective:     Patient ID: Yessy Brooks is a 28 y.o. female.    Chief Complaint:   Chief Complaint   Patient presents with    Follow-up     6 month f/u. States here for labs.         HPI: 2023 : Patient presents to clinic for annual wellness labs, states that she will be starting school next week and feels that her anxiety is in a very good and manageable place..  Patient does not take any daily medications that they need refilled, refilled BusPar at her last office visit. Patient denies chest pain, palpitations, and shortness of breath.  Patient denies fever, night sweats, chills, nausea, vomiting, diarrhea, constipation, weight loss, and changes in appetite.  Wellness labs (CBC, CMP, A1c, FLP, TSH, Free T4) ordered for patient today.          ROS:       12 point review of systems conducted, negative except as stated in the history of present illness. See HPI for details.    History:     History reviewed. No pertinent past medical history.     History reviewed. No pertinent surgical history.    Family History   Problem Relation Age of Onset    Hypertension Mother     Diabetes Mother     Heart disease Mother     Heart disease Father         Social History     Tobacco Use    Smoking status: Never    Smokeless tobacco: Never   Substance and Sexual Activity    Alcohol use: Yes     Comment: occ    Drug use: Not Currently    Sexual activity: Yes       Current Outpatient Medications   Medication Instructions    busPIRone (BUSPAR) 10 mg, Oral, 2 times daily    fluticasone propionate (FLONASE) 50 mcg, Each Nostril, Daily    loratadine (CLARITIN) 10 mg, Oral, Daily    ondansetron (ZOFRAN-ODT) 4 mg, Oral, 2 times daily PRN        Review of patient's allergies indicates:  No Known Allergies    Objective:     Visit Vitals  /82 (BP Location: Right arm, Patient Position: Sitting)   Pulse 68   Temp 98.7 °F (37.1 °C) (Oral)   Resp 18   Ht 5'  "9" (1.753 m)   Wt 107.4 kg (236 lb 11.2 oz)   LMP  (LMP Unknown)   SpO2 98%   BMI 34.95 kg/m²       Physical Examination:     Physical Exam  Vitals reviewed.   Constitutional:       Appearance: Normal appearance. She is normal weight.   HENT:      Head: Normocephalic.      Right Ear: Tympanic membrane, ear canal and external ear normal.      Left Ear: Tympanic membrane, ear canal and external ear normal.      Nose: Nose normal.      Mouth/Throat:      Mouth: Mucous membranes are moist.      Pharynx: Oropharynx is clear.   Eyes:      Extraocular Movements: Extraocular movements intact.      Conjunctiva/sclera: Conjunctivae normal.      Pupils: Pupils are equal, round, and reactive to light.   Cardiovascular:      Rate and Rhythm: Normal rate and regular rhythm.      Pulses: Normal pulses.      Heart sounds: Normal heart sounds.   Pulmonary:      Effort: Pulmonary effort is normal.      Breath sounds: Normal breath sounds.   Abdominal:      General: Abdomen is flat. Bowel sounds are normal.      Palpations: Abdomen is soft.   Musculoskeletal:         General: Normal range of motion.      Cervical back: Normal range of motion and neck supple.   Skin:     General: Skin is warm and dry.   Neurological:      General: No focal deficit present.      Mental Status: She is alert and oriented to person, place, and time.   Psychiatric:         Mood and Affect: Mood normal.         Behavior: Behavior normal.         Lab Results:     Chemistry:  Lab Results   Component Value Date     01/24/2022    K 4.7 01/24/2022    CHLORIDE 106 01/24/2022    BUN 13.4 01/24/2022    CREATININE 0.79 01/24/2022    GLUCOSE 84 01/24/2022    CALCIUM 10.1 01/24/2022    ALKPHOS 58 01/24/2022    LABPROT 7.8 01/24/2022    ALBUMIN 4.5 01/24/2022    BILIDIR 0.2 01/24/2022    IBILI 0.50 01/24/2022    AST 17 01/24/2022    ALT 23 01/24/2022    TSH 2.2431 01/24/2022    [id number] 0.88 01/24/2022        Lab Results   Component Value Date    HGBA1C 5.2 " "01/24/2022        Hematology:  Lab Results   Component Value Date    WBC 7.7 01/24/2022    HGB 15.3 01/24/2022    HCT 47.3 (H) 01/24/2022     01/24/2022       Lipid Panel:  No results found for: "CHOL", "HDL", "LDL", "TRIG", "TOTALCHOLEST"     Urine:  No results found for: "COLORUA", "APPEARANCEUA", "SGUA", "PHUA", "PROTEINUA", "GLUCOSEUA", "KETONESUA", "BLOODUA", "NITRITESUA", "LEUKOCYTESUR", "RBCUA", "WBCUA", "BACTERIA", "SQEPUA", "HYALINECASTS", "CREATRANDUR", "PROTEINURINE", "UPROTCREA"     Assessment:          ICD-10-CM ICD-9-CM   1. Encounter for wellness examination  Z00.00 V70.0   2. Anxiety  F41.9 300.00        Plan:     1. Encounter for wellness examination  -     TSH  -     T4, Free  -     Hemoglobin A1C  -     Lipid Panel  -     CBC Auto Differential  -     Comprehensive Metabolic Panel  -     Vitamin D  -     Urinalysis, Reflex to Urine Culture    2. Anxiety  Overview:    Practice deep breathing or abdominal breathing exercises when anxiety occurs.  Exercise daily. Get sunlight daily.  Avoid caffeine, alcohol and stimulants.  Practice positive phrases and repeat throughout the day, yoga, lavender scents or Chamomile tea will help anxiety.  Set healthy boundaries, avoid people and conversations that increase stress.  Reports any symptoms of suicidal or homicidal ideations immediately, if clinic is closed go to nearest emergency room.        Assessment & Plan:  Chronic, stable.  Continue BuSpar 10 mg b.i.d..  Patient to keep close assessment of this condition over the next year.    Orders:  -     busPIRone (BUSPAR) 10 MG tablet; Take 1 tablet (10 mg total) by mouth 2 (two) times daily.  Dispense: 180 tablet; Refill: 3         No follow-ups on file.    Future Appointments   Date Time Provider Department Center   4/8/2024  9:30 AM Carmel Miller NP [facility]        Carmel Milelr NP      I spent a total of 30 minutes on the day of the visit.This includes face to face " time and non-face to face time preparing to see the patient (eg, review of tests), obtaining and/or reviewing separately obtained history, documenting clinical information in the electronic or other health record, independently interpreting results and communicating results to the patient/family/caregiver, or care coordinator.

## 2023-10-11 ENCOUNTER — TELEPHONE (OUTPATIENT)
Dept: FAMILY MEDICINE | Facility: CLINIC | Age: 28
End: 2023-10-11
Payer: MEDICAID

## 2023-10-11 DIAGNOSIS — F41.9 ANXIETY: Chronic | ICD-10-CM

## 2023-10-11 RX ORDER — BUSPIRONE HYDROCHLORIDE 10 MG/1
10 TABLET ORAL 3 TIMES DAILY
Qty: 270 TABLET | Refills: 2 | Status: SHIPPED | OUTPATIENT
Start: 2023-10-11 | End: 2024-10-10

## 2023-10-11 NOTE — TELEPHONE ENCOUNTER
----- Message from Amy Valdez sent at 10/11/2023  9:47 AM CDT -----  Regarding: Med question  Pt is requesting a call from the nurse regarding the dose of her rx busPIRone (BUSPAR) 10 MG tablet.

## 2024-03-12 ENCOUNTER — OFFICE VISIT (OUTPATIENT)
Dept: FAMILY MEDICINE | Facility: CLINIC | Age: 29
End: 2024-03-12
Payer: COMMERCIAL

## 2024-03-12 VITALS
TEMPERATURE: 98 F | DIASTOLIC BLOOD PRESSURE: 81 MMHG | HEART RATE: 90 BPM | RESPIRATION RATE: 18 BRPM | HEIGHT: 69 IN | WEIGHT: 243.88 LBS | SYSTOLIC BLOOD PRESSURE: 114 MMHG | OXYGEN SATURATION: 99 % | BODY MASS INDEX: 36.12 KG/M2

## 2024-03-12 DIAGNOSIS — F41.9 ANXIETY: Primary | ICD-10-CM

## 2024-03-12 DIAGNOSIS — R41.840 INATTENTION: ICD-10-CM

## 2024-03-12 PROCEDURE — 3079F DIAST BP 80-89 MM HG: CPT | Mod: CPTII,,,

## 2024-03-12 PROCEDURE — 99214 OFFICE O/P EST MOD 30 MIN: CPT | Mod: S$PBB,,,

## 2024-03-12 PROCEDURE — 1160F RVW MEDS BY RX/DR IN RCRD: CPT | Mod: CPTII,,,

## 2024-03-12 PROCEDURE — 1159F MED LIST DOCD IN RCRD: CPT | Mod: CPTII,,,

## 2024-03-12 PROCEDURE — 3074F SYST BP LT 130 MM HG: CPT | Mod: CPTII,,,

## 2024-03-12 PROCEDURE — 3008F BODY MASS INDEX DOCD: CPT | Mod: CPTII,,,

## 2024-03-12 PROCEDURE — 99214 OFFICE O/P EST MOD 30 MIN: CPT | Mod: PBBFAC,PN

## 2024-03-12 NOTE — PROGRESS NOTES
Patient Name: Yessy Brooks     : 1995    MRN: [id number]     Subjective:     Patient ID: Yessy Brooks is a 28 y.o. female.    Chief Complaint:   Chief Complaint   Patient presents with    Follow-up     Patient requesting appointment. States went to therapist who recommended low dose of ADHD medication. States therapist thinks patient is on the spectrum and Rx would be effective.         HPI: 2024:  Patient self scheduled appointment today over recent visit with her therapist.  Patient states that after multiple sessions it has been discussed that she is likely on the spectrum, patient agrees with this suggestion from her therapist.  States that in high school her teachers and friends recommended that she was evaluated but her parents never followed through.  Therapist mentioned that patient would benefit from ADHD medication, patient states that she does find she has difficult time staying on task, has utilized multiple methods of time management but states that they are all unsuccessful.  She is able to function in her day-to-day life but would appreciate further evaluation and management.  Currently her only daily medication is BuSpar 10 mg t.i.d. to control anxiety symptoms, patient states that this is working great for her. Patient denies chest pain, palpitations, and shortness of breath.  Patient denies fever, night sweats, chills, nausea, vomiting, diarrhea, constipation, weight loss, and changes in appetite.        ROS:      12 point review of systems conducted, negative except as stated in the history of present illness. See HPI for details.    History:     Past Medical History:   Diagnosis Date    Anxiety         History reviewed. No pertinent surgical history.    Family History   Problem Relation Age of Onset    Hypertension Mother     Diabetes Mother     Heart disease Mother     Heart disease Father         Social History     Tobacco Use    Smoking status: Never    Smokeless tobacco:  "Never   Substance and Sexual Activity    Alcohol use: Yes     Comment: occ    Drug use: Not Currently    Sexual activity: Yes     Partners: Male       Current Outpatient Medications   Medication Instructions    busPIRone (BUSPAR) 10 mg, Oral, 3 times daily    fluticasone propionate (FLONASE) 50 mcg, Each Nostril, Daily    loratadine (CLARITIN) 10 mg, Oral, Daily    ondansetron (ZOFRAN-ODT) 4 mg, Oral, 2 times daily PRN        Review of patient's allergies indicates:  No Known Allergies    Objective:     Visit Vitals  /81 (BP Location: Right arm, Patient Position: Sitting)   Pulse 90   Temp 98.3 °F (36.8 °C) (Oral)   Resp 18   Ht 5' 9" (1.753 m)   Wt 110.6 kg (243 lb 14.4 oz)   SpO2 99%   BMI 36.02 kg/m²       Physical Examination:     Physical Exam  Constitutional:       General: She is not in acute distress.     Appearance: Normal appearance. She is not ill-appearing.   Cardiovascular:      Rate and Rhythm: Normal rate and regular rhythm.      Heart sounds: Normal heart sounds.   Pulmonary:      Effort: Pulmonary effort is normal. No respiratory distress.      Breath sounds: Normal breath sounds.   Musculoskeletal:      Cervical back: Normal range of motion.   Skin:     General: Skin is warm and dry.   Neurological:      Mental Status: She is alert and oriented to person, place, and time.   Psychiatric:         Mood and Affect: Mood normal.         Behavior: Behavior normal.         Lab Results:     Chemistry:  Lab Results   Component Value Date     08/08/2023    K 4.3 08/08/2023    CHLORIDE 108 (H) 08/08/2023    BUN 12.9 08/08/2023    CREATININE 0.81 08/08/2023    EGFRNORACEVR >60 08/08/2023    GLUCOSE 77 08/08/2023    CALCIUM 9.4 08/08/2023    ALKPHOS 54 08/08/2023    LABPROT 7.6 08/08/2023    ALBUMIN 4.2 08/08/2023    BILIDIR 0.2 01/24/2022    IBILI 0.50 01/24/2022    AST 20 08/08/2023    ALT 31 08/08/2023    TQQDZGSW18JR 34.4 08/08/2023    TSH 2.896 08/08/2023    EKCWZJ0JZCO 0.90 08/08/2023    "     Lab Results   Component Value Date    HGBA1C 5.2 08/08/2023        Hematology:  Lab Results   Component Value Date    WBC 9.22 08/08/2023    HGB 15.1 08/08/2023    HCT 46.0 08/08/2023     08/08/2023       Lipid Panel:  Lab Results   Component Value Date    CHOL 202 (H) 08/08/2023    HDL 33 (L) 08/08/2023    .00 08/08/2023    TRIG 187 (H) 08/08/2023    TOTALCHOLEST 6 (H) 08/08/2023        Urine:  Lab Results   Component Value Date    COLORUA Yellow 08/08/2023    APPEARANCEUA Turbid (A) 08/08/2023    SGUA 1.027 08/08/2023    PHUA 5.5 08/08/2023    PROTEINUA Trace (A) 08/08/2023    GLUCOSEUA Normal 08/08/2023    KETONESUA Negative 08/08/2023    BLOODUA Trace (A) 08/08/2023    NITRITESUA 2+ (A) 08/08/2023    LEUKOCYTESUR 25 (A) 08/08/2023    RBCUA 0-5 08/08/2023    WBCUA 6-10 (A) 08/08/2023    BACTERIA Many (A) 08/08/2023    SQEPUA Many (A) 08/08/2023    HYALINECASTS 0-2 (A) 08/08/2023        Assessment:          ICD-10-CM ICD-9-CM   1. Anxiety  F41.9 300.00   2. Inattention  R41.840 799.51        Plan:     1. Anxiety  Overview:    Practice deep breathing or abdominal breathing exercises when anxiety occurs.  Exercise daily. Get sunlight daily.  Avoid caffeine, alcohol and stimulants.  Practice positive phrases and repeat throughout the day, yoga, lavender scents or Chamomile tea will help anxiety.  Set healthy boundaries, avoid people and conversations that increase stress.  Reports any symptoms of suicidal or homicidal ideations immediately, if clinic is closed go to nearest emergency room.        Orders:  -     Ambulatory referral/consult to Behavioral Health; Future; Expected date: 03/12/2024    2. Inattention  -     Ambulatory referral/consult to Behavioral Health; Future; Expected date: 03/12/2024  -     E-Consult to Adult Psychiatry         Follow up in about 4 weeks (around 4/9/2024), or if symptoms worsen or fail to improve.    Future Appointments   Date Time Provider Department Center    4/8/2024  9:30 AM [facility]   5/22/2024 10:00 AM Yahir Morel MD [facility]        SHERWIN Manriquez spent a total of 30 minutes on the day of the visit.This includes face to face time and non-face to face time preparing to see the patient (eg, review of tests), obtaining and/or reviewing separately obtained history, documenting clinical information in the electronic or other health record, independently interpreting results and communicating results to the patient/family/caregiver, or care coordinator.

## 2024-03-15 ENCOUNTER — E-CONSULT (OUTPATIENT)
Dept: BEHAVIORAL HEALTH | Facility: CLINIC | Age: 29
End: 2024-03-15
Payer: COMMERCIAL

## 2024-03-15 DIAGNOSIS — F41.9 ANXIETY: Chronic | ICD-10-CM

## 2024-03-15 DIAGNOSIS — R41.840 POOR CONCENTRATION: Primary | ICD-10-CM

## 2024-03-15 PROCEDURE — 99451 NTRPROF PH1/NTRNET/EHR 5/>: CPT | Mod: S$PBB,,, | Performed by: STUDENT IN AN ORGANIZED HEALTH CARE EDUCATION/TRAINING PROGRAM

## 2024-03-15 NOTE — CONSULTS
"[facility]  Response for E-Consult     Patient Name: Yessy Brooks  MRN: [id number]  Primary Care Provider: Carmel Miller NP   Requesting Provider: Carmel Miller,*    E-Consult to Adult Psychiatry  Consult performed by: Yahir Morel MD  Consult ordered by: Carmel Miller NP      Per requesting provider:   "patient states she was recently told by her therapist she is on the spectrum and likely would benefit from low dose ADHD medications. She is set to [facility] with  on 5/22/24 but is there anything to add to her current regimine in meantime? "    Recommendation:   - Agree with psychiatry referral for formal evaluation  - While waiting for psychiatry appointment, can consider medication for inattention   - For non stimulant medication, recommend strattera (start with 25mg daily x2 weeks, then 25mg twice daily x2 wks, then 40mg twice daily thereafter)   - For stimulant medication, would start with vyvanse 30mg daily  - Continue buspirone, pt reports working well  - Recommend to continue psychotherapy for symptoms suspicious for ASD  - Recommend to screen for sleep disorders  - Recommend urine drug screen prior to prescribing medication for inattention symptoms    Total time of Consultation: 15 minute    I did not speak to the requesting provider verbally about this.     *This eConsult is based on the clinical data available to me and is furnished without benefit of a physical examination. The eConsult will need to be interpreted in light of any clinical issues or changes in patient status not available to me at the time of filing this eConsults. Significant changes in patient condition or level of acuity should result in immediate formal consultation and reevaluation. Please alert me if you have further questions.    Thank you for this eConsult referral.     Yahir Morel MD  [facility]      "

## 2024-03-18 ENCOUNTER — PATIENT MESSAGE (OUTPATIENT)
Dept: FAMILY MEDICINE | Facility: CLINIC | Age: 29
End: 2024-03-18
Payer: COMMERCIAL

## 2024-03-18 DIAGNOSIS — R41.840 INATTENTION: Primary | ICD-10-CM

## 2024-03-18 RX ORDER — ATOMOXETINE 25 MG/1
CAPSULE ORAL
Qty: 42 CAPSULE | Refills: 0 | Status: SHIPPED | OUTPATIENT
Start: 2024-03-18 | End: 2024-03-25 | Stop reason: SDUPTHER

## 2024-03-21 ENCOUNTER — TELEPHONE (OUTPATIENT)
Dept: FAMILY MEDICINE | Facility: CLINIC | Age: 29
End: 2024-03-21
Payer: COMMERCIAL

## 2024-03-21 DIAGNOSIS — R41.840 INATTENTION: ICD-10-CM

## 2024-03-22 NOTE — TELEPHONE ENCOUNTER
LVM for the patient to call me back for all the details. Will ask her if she would like to use [facility] and let her know that she will need to do a pharmacy to pharmacy transfer.

## 2024-03-25 RX ORDER — ATOMOXETINE 25 MG/1
CAPSULE ORAL
Qty: 42 CAPSULE | Refills: 0 | Status: SHIPPED | OUTPATIENT
Start: 2024-03-25 | End: 2024-04-19 | Stop reason: SDUPTHER

## 2024-03-25 NOTE — TELEPHONE ENCOUNTER
Called patient this morning, she is okay with [facility]. Please and thank you.

## 2024-04-19 ENCOUNTER — OFFICE VISIT (OUTPATIENT)
Dept: FAMILY MEDICINE | Facility: CLINIC | Age: 29
End: 2024-04-19
Payer: COMMERCIAL

## 2024-04-19 VITALS
WEIGHT: 241.13 LBS | TEMPERATURE: 98 F | HEIGHT: 69 IN | RESPIRATION RATE: 20 BRPM | BODY MASS INDEX: 35.71 KG/M2 | DIASTOLIC BLOOD PRESSURE: 82 MMHG | HEART RATE: 93 BPM | SYSTOLIC BLOOD PRESSURE: 123 MMHG | OXYGEN SATURATION: 98 %

## 2024-04-19 DIAGNOSIS — R82.90 ABNORMAL URINALYSIS: ICD-10-CM

## 2024-04-19 DIAGNOSIS — R41.840 INATTENTION: ICD-10-CM

## 2024-04-19 DIAGNOSIS — E78.00 ELEVATED CHOLESTEROL: ICD-10-CM

## 2024-04-19 DIAGNOSIS — F41.9 ANXIETY: Primary | ICD-10-CM

## 2024-04-19 LAB
ALBUMIN SERPL-MCNC: 4.2 G/DL (ref 3.5–5)
ALBUMIN/GLOB SERPL: 1.2 RATIO (ref 1.1–2)
ALP SERPL-CCNC: 55 UNIT/L (ref 40–150)
ALT SERPL-CCNC: 33 UNIT/L (ref 0–55)
APPEARANCE UR: ABNORMAL
AST SERPL-CCNC: 22 UNIT/L (ref 5–34)
BACTERIA #/AREA URNS AUTO: ABNORMAL /HPF
BILIRUB SERPL-MCNC: 0.5 MG/DL
BILIRUB UR QL STRIP.AUTO: NEGATIVE
BUN SERPL-MCNC: 10.1 MG/DL (ref 7–18.7)
CALCIUM SERPL-MCNC: 9.8 MG/DL (ref 8.4–10.2)
CHLORIDE SERPL-SCNC: 106 MMOL/L (ref 98–107)
CHOLEST SERPL-MCNC: 198 MG/DL
CHOLEST/HDLC SERPL: 6 {RATIO} (ref 0–5)
CO2 SERPL-SCNC: 23 MMOL/L (ref 22–29)
COLOR UR AUTO: YELLOW
CREAT SERPL-MCNC: 0.74 MG/DL (ref 0.55–1.02)
GFR SERPLBLD CREATININE-BSD FMLA CKD-EPI: >60 MLS/MIN/1.73/M2
GLOBULIN SER-MCNC: 3.4 GM/DL (ref 2.4–3.5)
GLUCOSE SERPL-MCNC: 90 MG/DL (ref 74–100)
GLUCOSE UR QL STRIP.AUTO: NORMAL
HDLC SERPL-MCNC: 34 MG/DL (ref 35–60)
HYALINE CASTS #/AREA URNS LPF: ABNORMAL /LPF
KETONES UR QL STRIP.AUTO: NEGATIVE
LDLC SERPL CALC-MCNC: 133 MG/DL (ref 50–140)
LEUKOCYTE ESTERASE UR QL STRIP.AUTO: 75
MUCOUS THREADS URNS QL MICRO: ABNORMAL /LPF
NITRITE UR QL STRIP.AUTO: ABNORMAL
PH UR STRIP.AUTO: 6 [PH]
POTASSIUM SERPL-SCNC: 4.7 MMOL/L (ref 3.5–5.1)
PROT SERPL-MCNC: 7.6 GM/DL (ref 6.4–8.3)
PROT UR QL STRIP.AUTO: ABNORMAL
RBC #/AREA URNS AUTO: ABNORMAL /HPF
RBC UR QL AUTO: NEGATIVE
SODIUM SERPL-SCNC: 136 MMOL/L (ref 136–145)
SP GR UR STRIP.AUTO: 1.03 (ref 1–1.03)
SQUAMOUS #/AREA URNS LPF: ABNORMAL /HPF
TRIGL SERPL-MCNC: 156 MG/DL (ref 37–140)
UROBILINOGEN UR STRIP-ACNC: NORMAL
VLDLC SERPL CALC-MCNC: 31 MG/DL
WBC #/AREA URNS AUTO: ABNORMAL /HPF

## 2024-04-19 PROCEDURE — 1160F RVW MEDS BY RX/DR IN RCRD: CPT | Mod: CPTII,,,

## 2024-04-19 PROCEDURE — 80061 LIPID PANEL: CPT

## 2024-04-19 PROCEDURE — 3008F BODY MASS INDEX DOCD: CPT | Mod: CPTII,,,

## 2024-04-19 PROCEDURE — 3074F SYST BP LT 130 MM HG: CPT | Mod: CPTII,,,

## 2024-04-19 PROCEDURE — 81001 URINALYSIS AUTO W/SCOPE: CPT

## 2024-04-19 PROCEDURE — 80053 COMPREHEN METABOLIC PANEL: CPT

## 2024-04-19 PROCEDURE — 99214 OFFICE O/P EST MOD 30 MIN: CPT | Mod: PBBFAC,PN

## 2024-04-19 PROCEDURE — 1159F MED LIST DOCD IN RCRD: CPT | Mod: CPTII,,,

## 2024-04-19 PROCEDURE — 99214 OFFICE O/P EST MOD 30 MIN: CPT | Mod: S$PBB,,,

## 2024-04-19 PROCEDURE — 3079F DIAST BP 80-89 MM HG: CPT | Mod: CPTII,,,

## 2024-04-19 PROCEDURE — 36415 COLL VENOUS BLD VENIPUNCTURE: CPT

## 2024-04-19 RX ORDER — ATOMOXETINE 60 MG/1
60 CAPSULE ORAL DAILY
Qty: 60 CAPSULE | Refills: 0 | Status: SHIPPED | OUTPATIENT
Start: 2024-04-19 | End: 2024-05-22 | Stop reason: SDUPTHER

## 2024-04-19 NOTE — PROGRESS NOTES
Patient Name: Yessy Brooks     : 1995    MRN: [id number]     Subjective:     Patient ID: Yessy Brooks is a 28 y.o. female.    Chief Complaint:   Chief Complaint   Patient presents with    Follow-up     F/u appointment. Here for wellness. States wants to talk about how the medication is going.         HPI: 2024: patient here for medication follow up, started on Strattera at last visit. Patient states noticed much improvement in overall symptoms since starting strattera, tolerating medication well. Has appointment to establish care with  on 24. Doing well overall, also due to follow up of abnormal urinalysis and elevated lipid panel. Was not fasting previously and will make plan to start medication if continued elevation found.     2024:  Patient self scheduled appointment today over recent visit with her therapist.  Patient states that after multiple sessions it has been discussed that she is likely on the spectrum, patient agrees with this suggestion from her therapist.  States that in high school her teachers and friends recommended that she was evaluated but her parents never followed through.  Therapist mentioned that patient would benefit from ADHD medication, patient states that she does find she has difficult time staying on task, has utilized multiple methods of time management but states that they are all unsuccessful.  She is able to function in her day-to-day life but would appreciate further evaluation and management.  Currently her only daily medication is BuSpar 10 mg t.i.d. to control anxiety symptoms, patient states that this is working great for her. Patient denies chest pain, palpitations, and shortness of breath.  Patient denies fever, night sweats, chills, nausea, vomiting, diarrhea, constipation, weight loss, and changes in appetite.        ROS:       12 point review of systems conducted, negative except as stated in the history of present illness. See HPI for  "details.    History:     Past Medical History:   Diagnosis Date    Anxiety         No past surgical history on file.    Family History   Problem Relation Name Age of Onset    Hypertension Mother      Diabetes Mother      Heart disease Mother      Heart disease Father          Social History     Tobacco Use    Smoking status: Never    Smokeless tobacco: Never   Substance and Sexual Activity    Alcohol use: Yes     Comment: occ    Drug use: Yes     Types: Marijuana    Sexual activity: Yes     Partners: Male       Current Outpatient Medications   Medication Instructions    atomoxetine (STRATTERA) 60 mg, Oral, Daily    busPIRone (BUSPAR) 10 mg, Oral, 3 times daily    fluticasone propionate (FLONASE) 50 mcg, Each Nostril, Daily    loratadine (CLARITIN) 10 mg, Oral, Daily    ondansetron (ZOFRAN-ODT) 4 mg, Oral, 2 times daily PRN        Review of patient's allergies indicates:  No Known Allergies    Objective:     Visit Vitals  /82 (BP Location: Right arm, Patient Position: Sitting)   Pulse 93   Temp 98.3 °F (36.8 °C) (Oral)   Resp 20   Ht 5' 9" (1.753 m)   Wt 109.4 kg (241 lb 1.6 oz)   LMP  (LMP Unknown)   SpO2 98%   BMI 35.60 kg/m²       Physical Examination:     Physical Exam  Constitutional:       General: She is not in acute distress.     Appearance: Normal appearance. She is not ill-appearing.   Cardiovascular:      Rate and Rhythm: Normal rate and regular rhythm.      Heart sounds: Normal heart sounds.   Pulmonary:      Effort: Pulmonary effort is normal. No respiratory distress.      Breath sounds: Normal breath sounds.   Musculoskeletal:      Cervical back: Normal range of motion.   Skin:     General: Skin is warm and dry.   Neurological:      Mental Status: She is alert and oriented to person, place, and time.   Psychiatric:         Mood and Affect: Mood normal.         Behavior: Behavior normal.         Lab Results:     Chemistry:  Lab Results   Component Value Date     08/08/2023    K 4.3 08/08/2023 "    CHLORIDE 108 (H) 08/08/2023    BUN 12.9 08/08/2023    CREATININE 0.81 08/08/2023    EGFRNORACEVR >60 08/08/2023    GLUCOSE 77 08/08/2023    CALCIUM 9.4 08/08/2023    ALKPHOS 54 08/08/2023    LABPROT 7.6 08/08/2023    ALBUMIN 4.2 08/08/2023    BILIDIR 0.2 01/24/2022    IBILI 0.50 01/24/2022    AST 20 08/08/2023    ALT 31 08/08/2023    [id number] 34.4 08/08/2023    TSH 2.896 08/08/2023    WFSULP1JSJT 0.90 08/08/2023        Lab Results   Component Value Date    HGBA1C 5.2 08/08/2023        Hematology:  Lab Results   Component Value Date    WBC 9.22 08/08/2023    HGB 15.1 08/08/2023    HCT 46.0 08/08/2023     08/08/2023       Lipid Panel:  Lab Results   Component Value Date    CHOL 202 (H) 08/08/2023    HDL 33 (L) 08/08/2023    .00 08/08/2023    TRIG 187 (H) 08/08/2023    TOTALCHOLEST 6 (H) 08/08/2023        Urine:  Lab Results   Component Value Date    COLORUA Yellow 08/08/2023    APPEARANCEUA Turbid (A) 08/08/2023    SGUA 1.027 08/08/2023    PHUA 5.5 08/08/2023    PROTEINUA Trace (A) 08/08/2023    GLUCOSEUA Normal 08/08/2023    KETONESUA Negative 08/08/2023    BLOODUA Trace (A) 08/08/2023    NITRITESUA 2+ (A) 08/08/2023    LEUKOCYTESUR 25 (A) 08/08/2023    RBCUA 0-5 08/08/2023    WBCUA 6-10 (A) 08/08/2023    BACTERIA Many (A) 08/08/2023    SQEPUA Many (A) 08/08/2023    HYALINECASTS 0-2 (A) 08/08/2023        Assessment:          ICD-10-CM ICD-9-CM   1. Anxiety  F41.9 300.00   2. Inattention  R41.840 799.51   3. Elevated cholesterol  E78.00 272.0   4. Abnormal urinalysis  R82.90 791.9        Plan:     1. Anxiety  Overview:    Practice deep breathing or abdominal breathing exercises when anxiety occurs.  Exercise daily. Get sunlight daily.  Avoid caffeine, alcohol and stimulants.  Practice positive phrases and repeat throughout the day, yoga, lavender scents or Chamomile tea will help anxiety.  Set healthy boundaries, avoid people and conversations that increase stress.  Reports any symptoms of  suicidal or homicidal ideations immediately, if clinic is closed go to nearest emergency room.        Orders:  -     Comprehensive Metabolic Panel    2. Inattention  Assessment & Plan:  Increase strattera to 60 mg daily, chronic and improving issue.        Orders:  -     atomoxetine (STRATTERA) 60 MG capsule; Take 1 capsule (60 mg total) by mouth once daily.  Dispense: 60 capsule; Refill: 0    3. Elevated cholesterol  Comments:  Fasting lipid panel today, we will discuss medication options once this is obtained if still elevated  Orders:  -     Lipid Panel  -     Comprehensive Metabolic Panel    4. Abnormal urinalysis  Comments:  Urinalysis today  Orders:  -     Urinalysis, Reflex to Urine Culture  -     Comprehensive Metabolic Panel         Follow up in about 8 months (around 12/19/2024), or if symptoms worsen or fail to improve, for routine labs recheck.    Future Appointments   Date Time Provider Department Center   5/22/2024 10:00 AM [facility]   12/19/2024  9:00 AM [facility] Miller NP [facility]        Carmel Miller NP

## 2024-05-22 ENCOUNTER — OFFICE VISIT (OUTPATIENT)
Dept: BEHAVIORAL HEALTH | Facility: CLINIC | Age: 29
End: 2024-05-22
Payer: COMMERCIAL

## 2024-05-22 VITALS
SYSTOLIC BLOOD PRESSURE: 118 MMHG | WEIGHT: 237.63 LBS | HEART RATE: 90 BPM | BODY MASS INDEX: 35.09 KG/M2 | DIASTOLIC BLOOD PRESSURE: 72 MMHG | OXYGEN SATURATION: 100 % | TEMPERATURE: 99 F

## 2024-05-22 DIAGNOSIS — F90.9 ADULT ADHD: Primary | ICD-10-CM

## 2024-05-22 DIAGNOSIS — F41.1 GAD (GENERALIZED ANXIETY DISORDER): ICD-10-CM

## 2024-05-22 PROCEDURE — 99213 OFFICE O/P EST LOW 20 MIN: CPT | Mod: PBBFAC,PN | Performed by: STUDENT IN AN ORGANIZED HEALTH CARE EDUCATION/TRAINING PROGRAM

## 2024-05-22 PROCEDURE — 3008F BODY MASS INDEX DOCD: CPT | Mod: CPTII,,, | Performed by: STUDENT IN AN ORGANIZED HEALTH CARE EDUCATION/TRAINING PROGRAM

## 2024-05-22 PROCEDURE — 3074F SYST BP LT 130 MM HG: CPT | Mod: CPTII,,, | Performed by: STUDENT IN AN ORGANIZED HEALTH CARE EDUCATION/TRAINING PROGRAM

## 2024-05-22 PROCEDURE — 3078F DIAST BP <80 MM HG: CPT | Mod: CPTII,,, | Performed by: STUDENT IN AN ORGANIZED HEALTH CARE EDUCATION/TRAINING PROGRAM

## 2024-05-22 PROCEDURE — 99205 OFFICE O/P NEW HI 60 MIN: CPT | Mod: S$PBB,,, | Performed by: STUDENT IN AN ORGANIZED HEALTH CARE EDUCATION/TRAINING PROGRAM

## 2024-05-22 PROCEDURE — 1159F MED LIST DOCD IN RCRD: CPT | Mod: CPTII,,, | Performed by: STUDENT IN AN ORGANIZED HEALTH CARE EDUCATION/TRAINING PROGRAM

## 2024-05-22 PROCEDURE — 1160F RVW MEDS BY RX/DR IN RCRD: CPT | Mod: CPTII,,, | Performed by: STUDENT IN AN ORGANIZED HEALTH CARE EDUCATION/TRAINING PROGRAM

## 2024-05-22 RX ORDER — ATOMOXETINE 80 MG/1
80 CAPSULE ORAL DAILY
Qty: 30 CAPSULE | Refills: 2 | Status: SHIPPED | OUTPATIENT
Start: 2024-05-22 | End: 2024-06-21

## 2024-05-22 RX ORDER — BUSPIRONE HYDROCHLORIDE 15 MG/1
15 TABLET ORAL 3 TIMES DAILY
Qty: 90 TABLET | Refills: 5 | Status: SHIPPED | OUTPATIENT
Start: 2024-05-22 | End: 2025-05-22
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
## 2024-06-07 ENCOUNTER — OFFICE VISIT (OUTPATIENT)
Dept: URGENT CARE | Facility: CLINIC | Age: 29
End: 2024-06-07
Payer: COMMERCIAL

## 2024-06-07 VITALS
WEIGHT: 236 LBS | OXYGEN SATURATION: 100 % | HEIGHT: 69 IN | DIASTOLIC BLOOD PRESSURE: 91 MMHG | TEMPERATURE: 97 F | SYSTOLIC BLOOD PRESSURE: 131 MMHG | BODY MASS INDEX: 34.96 KG/M2 | HEART RATE: 117 BPM | RESPIRATION RATE: 16 BRPM

## 2024-06-07 DIAGNOSIS — R42 EPISODE OF DIZZINESS: Primary | ICD-10-CM

## 2024-06-07 DIAGNOSIS — F41.9 ANXIETY: Chronic | ICD-10-CM

## 2024-06-07 LAB — GLUCOSE SERPL-MCNC: 150 MG/DL (ref 70–110)

## 2024-06-07 PROCEDURE — 99215 OFFICE O/P EST HI 40 MIN: CPT | Mod: PBBFAC

## 2024-06-07 PROCEDURE — 99213 OFFICE O/P EST LOW 20 MIN: CPT | Mod: S$PBB,,,

## 2024-06-07 PROCEDURE — 82962 GLUCOSE BLOOD TEST: CPT | Mod: PBBFAC

## 2024-06-07 NOTE — PROGRESS NOTES
"Subjective:       Patient ID: Yessy Brooks is a 28 y.o. female.    Vitals:  height is 5' 9" (1.753 m) and weight is 107 kg (236 lb). Her oral temperature is 97.4 °F (36.3 °C). Her blood pressure is 131/91 (abnormal) and her pulse is 117 (abnormal). Her respiration is 16 and oxygen saturation is 100%.     Chief Complaint: Dizziness (Dizziness, weakness, fatigue and shaky, started at 1 pm. Patient reports only eating toast with peanut butter and taking Buspar and Strattera at 1130. Patient reports being on these meds over 1 month.)    Intermiti dizziness, increased stressors    Dizziness:    Associated symptoms: nausea.      Constitution: Positive for appetite change.   Gastrointestinal:  Positive for nausea.   Neurological:  Positive for dizziness.   Psychiatric/Behavioral:  Positive for nervous/anxious. The patient is nervous/anxious.        Objective:      Physical Exam      Assessment:       No diagnosis found.      Plan:         There are no diagnoses linked to this encounter.       Results for orders placed or performed in visit on 04/19/24   Lipid Panel   Result Value Ref Range    Cholesterol Total 198 <=200 mg/dL    HDL Cholesterol 34 (L) 35 - 60 mg/dL    Triglyceride 156 (H) 37 - 140 mg/dL    Cholesterol/HDL Ratio 6 (H) 0 - 5    Very Low Density Lipoprotein 31     LDL Cholesterol 133.00 50.00 - 140.00 mg/dL   Urinalysis, Reflex to Urine Culture    Specimen: Urine   Result Value Ref Range    Color, UA Yellow Yellow, Light-Yellow, Dark Yellow, Adrianna, Straw    Appearance, UA Turbid (A) Clear    Specific Gravity, UA 1.031 (H) 1.005 - 1.030    pH, UA 6.0 5.0 - 8.5    Protein, UA Trace (A) Negative    Glucose, UA Normal Negative, Normal    Ketones, UA Negative Negative    Blood, UA Negative Negative    Bilirubin, UA Negative Negative    Urobilinogen, UA Normal 0.2, 1.0, Normal    Nitrites, UA 2+ (A) Negative    Leukocyte Esterase, UA 75 (A) Negative    WBC, UA 6-10 (A) None Seen, 0-2, 3-5, 0-5 /HPF    " Bacteria, UA Occ (A) None Seen /HPF    Squamous Epithelial Cells, UA Moderate (A) None Seen /HPF    Mucous, UA Occ (A) None Seen /LPF    Hyaline Casts, UA None Seen None Seen /lpf    RBC, UA 0-5 None Seen, 0-2, 3-5, 0-5 /HPF   Comprehensive Metabolic Panel   Result Value Ref Range    Sodium 136 136 - 145 mmol/L    Potassium 4.7 3.5 - 5.1 mmol/L    Chloride 106 98 - 107 mmol/L    CO2 23 22 - 29 mmol/L    Glucose 90 74 - 100 mg/dL    Blood Urea Nitrogen 10.1 7.0 - 18.7 mg/dL    Creatinine 0.74 0.55 - 1.02 mg/dL    Calcium 9.8 8.4 - 10.2 mg/dL    Protein Total 7.6 6.4 - 8.3 gm/dL    Albumin 4.2 3.5 - 5.0 g/dL    Globulin 3.4 2.4 - 3.5 gm/dL    Albumin/Globulin Ratio 1.2 1.1 - 2.0 ratio    Bilirubin Total 0.5 <=1.5 mg/dL    ALP 55 40 - 150 unit/L    ALT 33 0 - 55 unit/L    AST 22 5 - 34 unit/L    eGFR >60 mls/min/1.73/m2

## 2024-06-07 NOTE — LETTER
June 7, 2024      [facility]  [address] [address]  Phone: [phone]       Patient: Yessy Brooks   YOB: 1995  Date of Visit: 06/07/2024    To Whom It May Concern:    Sandip Brooks  was at [facility] on 06/07/2024. The patient may return to work/school on 06/08/2024 with no restrictions. If you have any questions or concerns, or if I can be of further assistance, please do not hesitate to contact me.    Sincerely,    ATILIO Siddiqi

## 2024-06-07 NOTE — PROGRESS NOTES
"Subjective:       Patient ID: Yessy Brooks is a 28 y.o. female.    Vitals:  height is 5' 9" (1.753 m) and weight is 107 kg (236 lb). Her oral temperature is 97.4 °F (36.3 °C). Her blood pressure is 131/91 (abnormal) and her pulse is 117 (abnormal). Her respiration is 16 and oxygen saturation is 100%.     Chief Complaint: Dizziness (Dizziness, weakness, fatigue and shaky, started at 1 pm. Patient reports only eating toast with peanut butter and taking Buspar and Strattera at 1130. Patient reports being on these meds over 1 month.)    28-year-old  female presents to the clinic with mother, reports symptoms onset this morning which has slowly improved.  Reports skin was clammy initially but has resolved.  Admits to increase stressors, denies SI or HI ideation at the moment. States her appetite has significantly decreased since   atomoxetine adjustment.     Dizziness: no palpitations and no chest pain.      Constitution: Positive for appetite change and generalized weakness.   HENT:  Positive for sore throat.    Cardiovascular:  Negative for chest pain, palpitations and sob on exertion.   Respiratory:  Negative for chest tightness and shortness of breath.    Neurological:  Positive for dizziness.   Psychiatric/Behavioral:  Positive for nervous/anxious. Negative for suicidal ideas and self-injury. The patient is nervous/anxious.        Objective:      Physical Exam   Constitutional: She is oriented to person, place, and time. She is cooperative. She is easily aroused. She does not appear ill. awake  HENT:   Head: Normocephalic and atraumatic.   Ears:   Right Ear: A middle ear effusion is present.   Left Ear: Tympanic membrane normal.   Nose: Nose normal.   Mouth/Throat: Uvula is midline and oropharynx is clear and moist. Mucous membranes are pale.   Eyes: Lids are normal.   Neck: Neck supple.   Cardiovascular: S1 normal, S2 normal and normal heart sounds. Tachycardia present.   Pulses:       Radial pulses " are 2+ on the right side and 2+ on the left side.      Comments: Apical 111 beats per minute   Pulmonary/Chest: Effort normal and breath sounds normal.   + deep breathing          Comments: + deep breathing     Abdominal: Normal appearance.   Neurological: She is alert, oriented to person, place, and time and easily aroused. Gait normal. GCS eye subscore is 4. GCS verbal subscore is 5. GCS motor subscore is 6.   Skin: Skin is warm, dry and intact. Capillary refill takes less than 2 seconds.   Psychiatric: She experiences Normal attention and Normal perception. Her speech is normal and behavior is normal. Memory normal. Her mood appears anxious. Cognition normal  Nursing note and vitals reviewed.        Assessment:       1. Episode of dizziness    2. Anxiety          Plan:     No concerns for hypoglycemia, differentials discussed with patient.  Encouraged patient to eat high-protein small frequent meals, ensure she remains hydrated.  Decrease your stress, practice breathing techniques.  ED precautions discussed, patient verbalizes understanding.  Yessy  appears stable for discharge.      Episode of dizziness  -     POCT Glucose, Hand-Held Device    Anxiety           Results for orders placed or performed in visit on 06/07/24   POCT Glucose, Hand-Held Device   Result Value Ref Range    POC Glucose 150 (A) 70 - 110 MG/DL

## 2024-07-30 ENCOUNTER — OFFICE VISIT (OUTPATIENT)
Dept: BEHAVIORAL HEALTH | Facility: CLINIC | Age: 29
End: 2024-07-30
Payer: COMMERCIAL

## 2024-07-30 VITALS
BODY MASS INDEX: 34.56 KG/M2 | WEIGHT: 234 LBS | HEART RATE: 91 BPM | OXYGEN SATURATION: 100 % | SYSTOLIC BLOOD PRESSURE: 121 MMHG | DIASTOLIC BLOOD PRESSURE: 86 MMHG | TEMPERATURE: 98 F

## 2024-07-30 DIAGNOSIS — F90.9 ADULT ADHD: Primary | ICD-10-CM

## 2024-07-30 DIAGNOSIS — F41.1 GAD (GENERALIZED ANXIETY DISORDER): ICD-10-CM

## 2024-07-30 PROCEDURE — 99213 OFFICE O/P EST LOW 20 MIN: CPT | Mod: PBBFAC,PN | Performed by: STUDENT IN AN ORGANIZED HEALTH CARE EDUCATION/TRAINING PROGRAM

## 2024-07-30 PROCEDURE — 1159F MED LIST DOCD IN RCRD: CPT | Mod: CPTII,,, | Performed by: STUDENT IN AN ORGANIZED HEALTH CARE EDUCATION/TRAINING PROGRAM

## 2024-07-30 PROCEDURE — 3074F SYST BP LT 130 MM HG: CPT | Mod: CPTII,,, | Performed by: STUDENT IN AN ORGANIZED HEALTH CARE EDUCATION/TRAINING PROGRAM

## 2024-07-30 PROCEDURE — 99214 OFFICE O/P EST MOD 30 MIN: CPT | Mod: S$PBB,,, | Performed by: STUDENT IN AN ORGANIZED HEALTH CARE EDUCATION/TRAINING PROGRAM

## 2024-07-30 PROCEDURE — 3008F BODY MASS INDEX DOCD: CPT | Mod: CPTII,,, | Performed by: STUDENT IN AN ORGANIZED HEALTH CARE EDUCATION/TRAINING PROGRAM

## 2024-07-30 PROCEDURE — 3079F DIAST BP 80-89 MM HG: CPT | Mod: CPTII,,, | Performed by: STUDENT IN AN ORGANIZED HEALTH CARE EDUCATION/TRAINING PROGRAM

## 2024-07-30 PROCEDURE — 1160F RVW MEDS BY RX/DR IN RCRD: CPT | Mod: CPTII,,, | Performed by: STUDENT IN AN ORGANIZED HEALTH CARE EDUCATION/TRAINING PROGRAM

## 2024-07-30 RX ORDER — ATOMOXETINE 80 MG/1
80 CAPSULE ORAL DAILY
Qty: 30 CAPSULE | Refills: 5 | Status: SHIPPED | OUTPATIENT
Start: 2024-07-30

## 2024-07-30 NOTE — PROGRESS NOTES
"Outpatient Psychiatry Follow-Up Visit    7/30/2024    Clinical Status of Patient:  Outpatient (Ambulatory)    Chief Complaint:  Yessy Brooks is a 29 y.o. female who presents today for follow-up of anxiety and attention problems. Patient last seen for initial evaluation on 5/22/2024. Met with patient.      Interval History and Content of Current Session:  Interim Events/Subjective Report/Content of Current Session:   Pt reports doing "better"  overall.  Notes that she's been attending to tasks around her residence ("I cleaned my room"), also has been putting in job applications.  Reports "better" mood, denies currently feeling depressed, improved anxiety.  Sleeping increased, denies difficulty falling asleep.  Appetite "leveling out", weight decreased (unintentional).  Energy "good", motivation improved, concentration better.  Denies irritability, occasional hopelessness.  Endorses passive, fleeting SI (no plan or desire).  Denies HI/AVH/paranoia, denies plan or desire for self harm or harm to others.  Reports SE from current regimen: decreased appetite (strattera?). Denies change in chronic somatic complaints. Pt happy with current regimen and wants to continue.     Psychiatric Review of Systems-is patient experiencing or having changes in  Integrated into HPI above.     Review of Systems   PSYCHIATRIC: Pertinant items are noted in the narrative.  CONSTITUTIONAL: +weight loss, denies fatigue, denies f/c  MUSCULOSKELETAL: No pain or stiffness of the joints.  NEUROLOGIC: No weakness, sensory changes, seizures, confusion, memory loss, tremor or other abnormal movements.  CARDIAC: No CP, no palpitations  RESPIRATORY: No shortness of breath.  CARDIOVASCULAR: No tachycardia or chest pain.  GASTROINTESTINAL: No nausea, vomiting, pain, constipation or diarrhea.    Past Medical, Family and Social History: The patient's past medical, family and social history have been reviewed and updated as appropriate within the " "electronic medical record - see encounter notes.    Compliance: good    Side effects: see above    Risk Parameters:  Patient reports suicidal ideation: passive, fleeting  Patient reports no homicidal ideation  Patient reports no self-injurious behavior  Patient reports no violent behavior    Exam (detailed: at least 9 elements; comprehensive: all 15 elements)   Constitutional  Vitals:  Most recent vital signs, dated less than 90 days prior to this appointment, were reviewed.     Vitals:    07/30/24 0758   BP: 121/86   Pulse: 91   Temp: 98.1 °F (36.7 °C)   SpO2: 100%   Weight: 106.1 kg (234 lb)        General:   Constitutional: No acute distress, appears stated age, casually dressed    Neurologic:   Motor: moves all extremities spontaneously and without difficulty, no abnormal involuntary movements observed  Gait: normal gait and station    Mental status examination:   Appearance: appears stated age, casually dressed, no acute distress  Behavior: unremarkable for situation, calm and cooperative  Mood: "better"  Affect: mood congruent and constricted  Thought process: linear and goal directed  Thought content: no plan or desire for self harm or harm to others, denies paranoia, no delusional ideation volunteered  Perceptions: denies hallucinations or other altered perceptions  Associations: appropriate for conversation  Orientation: oriented to day of week, month, year, location, and situation  Language: English, fluid  Attention: able to attend to interview  Insight: good  Judgement: good    PHQ9:  Over the last two weeks how often have you been bothered by little interest or pleasure in doing things: 2  Over the last two weeks how often have you been bothered by feeling down, depressed or hopeless: 2  PHQ-2 Total Score: 4  PHQ-9 Score: 10  PHQ-9 Interpretation: Moderate        7/30/2024     7:57 AM 5/22/2024    10:05 AM   GAD7   1. Feeling nervous, anxious, or on edge? 1 3   2. Not being able to stop or control " worrying? 1 3   3. Worrying too much about different things? 1 3   4. Trouble relaxing? 1 3   5. Being so restless that it is hard to sit still? 1 0   6. Becoming easily annoyed or irritable? 0 0   7. Feeling afraid as if something awful might happen? 1 3   8. If you checked off any problems, how difficult have these problems made it for you to do your work, take care of things at home, or get along with other people? 1 3   KEVEN-7 Score 6 15     Assessment and Diagnosis   Status/Progress: Based on the examination today, the patient's problem(s) is/are resolving.  New problems have not been presented today.   Co-morbidities and Lack of compliance are not complicating management of the primary condition.  Number of separate conditions addressed during today's visit: 2 (ADHD improved, anxiety improved).  Medication management: yes: Refilling a prescription and Deciding to continue a pre-existing prescription.  Are referral(s) being ordered today: No.  Complexity (level) of medical decision making employed in the encounter: MODERATE.    General Impression:    ICD-10-CM ICD-9-CM   1. Adult ADHD  F90.9 314.01   2. KEVEN (generalized anxiety disorder)  F41.1 300.02     Intervention/Counseling/Treatment Plan   Continue buspirone 15mg tid  Continue atomoxetine 80mg daily for inattention  Recommend pt continue with her psychotherapist/counselor  No need for PEC as pt is not an imminent danger to self or others or gravely disabled due to acute psychiatric illness  Discussed that pt should either call clinic for psychiatric crisis symptoms or present to nearest emergency room    Discussed with patient informed consent including diagnosis, risks and benefits of proposed treatment above vs. alternative treatments vs. no treatment, as well as serious and common side effects of these treatments, and the inherent unpredictability of individual responses to these treatments. The patient expresses understanding of the above and displays  the capacity to agree with this current plan. Patient also agrees that, currently, the benefits outweigh the risks and would like to pursue treatment at this time, and had no other questions.    Instructions:  Take all medications as prescribed.    Abstain from recreational drugs and alcohol.  Present to ED or call 911 for SI/HI plan or intent, psychosis, or medical emergency.    Return to Clinic: Follow up in about 3 months (around 10/30/2024).    Total time:   The total time for services performed on the date of the encounter (including review of prior visit notes, review of notes from other providers, review of results from laboratory/imaging studies, face-to-face time with patient, and time spent on other activities directly related to patient care): 20 minutes.    Yahir Morel MD  [facility]

## 2024-10-30 ENCOUNTER — OFFICE VISIT (OUTPATIENT)
Dept: BEHAVIORAL HEALTH | Facility: CLINIC | Age: 29
End: 2024-10-30
Payer: COMMERCIAL

## 2024-10-30 VITALS
DIASTOLIC BLOOD PRESSURE: 81 MMHG | OXYGEN SATURATION: 100 % | SYSTOLIC BLOOD PRESSURE: 133 MMHG | BODY MASS INDEX: 33.39 KG/M2 | TEMPERATURE: 98 F | WEIGHT: 226.13 LBS | HEART RATE: 96 BPM

## 2024-10-30 DIAGNOSIS — F41.1 GAD (GENERALIZED ANXIETY DISORDER): ICD-10-CM

## 2024-10-30 DIAGNOSIS — F90.9 ADULT ADHD: ICD-10-CM

## 2024-10-30 PROCEDURE — 1159F MED LIST DOCD IN RCRD: CPT | Mod: CPTII,,, | Performed by: STUDENT IN AN ORGANIZED HEALTH CARE EDUCATION/TRAINING PROGRAM

## 2024-10-30 PROCEDURE — 99214 OFFICE O/P EST MOD 30 MIN: CPT | Mod: S$PBB,,, | Performed by: STUDENT IN AN ORGANIZED HEALTH CARE EDUCATION/TRAINING PROGRAM

## 2024-10-30 PROCEDURE — 3008F BODY MASS INDEX DOCD: CPT | Mod: CPTII,,, | Performed by: STUDENT IN AN ORGANIZED HEALTH CARE EDUCATION/TRAINING PROGRAM

## 2024-10-30 PROCEDURE — 3079F DIAST BP 80-89 MM HG: CPT | Mod: CPTII,,, | Performed by: STUDENT IN AN ORGANIZED HEALTH CARE EDUCATION/TRAINING PROGRAM

## 2024-10-30 PROCEDURE — 99213 OFFICE O/P EST LOW 20 MIN: CPT | Mod: PBBFAC,PN | Performed by: STUDENT IN AN ORGANIZED HEALTH CARE EDUCATION/TRAINING PROGRAM

## 2024-10-30 PROCEDURE — 1160F RVW MEDS BY RX/DR IN RCRD: CPT | Mod: CPTII,,, | Performed by: STUDENT IN AN ORGANIZED HEALTH CARE EDUCATION/TRAINING PROGRAM

## 2024-10-30 PROCEDURE — 3075F SYST BP GE 130 - 139MM HG: CPT | Mod: CPTII,,, | Performed by: STUDENT IN AN ORGANIZED HEALTH CARE EDUCATION/TRAINING PROGRAM

## 2024-10-30 RX ORDER — BUSPIRONE HYDROCHLORIDE 15 MG/1
15 TABLET ORAL 3 TIMES DAILY
Qty: 90 TABLET | Refills: 5 | Status: SHIPPED | OUTPATIENT
Start: 2024-10-30 | End: 2025-10-30

## 2024-10-30 RX ORDER — ATOMOXETINE 80 MG/1
80 CAPSULE ORAL DAILY
Qty: 30 CAPSULE | Refills: 5 | Status: SHIPPED | OUTPATIENT
Start: 2024-10-30

## 2024-12-19 ENCOUNTER — TELEPHONE (OUTPATIENT)
Dept: FAMILY MEDICINE | Facility: CLINIC | Age: 29
End: 2024-12-19
Payer: COMMERCIAL

## 2024-12-19 NOTE — TELEPHONE ENCOUNTER
----- Message from Amber sent at 12/19/2024  9:36 AM CST -----  .Who Called: Yessy Newsomeagustin    Caller is requesting assistance/information from provider's office.    Symptoms (please be specific): needs info about who she will be seeing since titi is gone    How long has patient had these symptoms:    List of preferred pharmacies on file (remove unneeded): [unfilled]  If different, enter pharmacy into here including location and phone number:       Preferred Method of Contact: Phone Call  Patient's Preferred Phone Number on File: [phone]   Best Call Back Number, if different:  Additional Information:

## 2024-12-19 NOTE — TELEPHONE ENCOUNTER
Called and spoke with patient, returning phone call. Verified . Patient reminder of appointment with our clinic and provider on 25 at 1040. Patient aware to reach out with any medication refill requests, questions, or concerns. Verbalized understanding.

## 2025-04-23 ENCOUNTER — OFFICE VISIT (OUTPATIENT)
Dept: BEHAVIORAL HEALTH | Facility: CLINIC | Age: 30
End: 2025-04-23
Payer: COMMERCIAL

## 2025-04-23 VITALS
TEMPERATURE: 98 F | DIASTOLIC BLOOD PRESSURE: 80 MMHG | HEART RATE: 91 BPM | OXYGEN SATURATION: 98 % | RESPIRATION RATE: 18 BRPM | SYSTOLIC BLOOD PRESSURE: 119 MMHG | WEIGHT: 222.31 LBS | BODY MASS INDEX: 32.93 KG/M2 | HEIGHT: 69 IN

## 2025-04-23 DIAGNOSIS — F90.9 ADULT ADHD: Primary | ICD-10-CM

## 2025-04-23 DIAGNOSIS — F41.1 GAD (GENERALIZED ANXIETY DISORDER): ICD-10-CM

## 2025-04-23 PROCEDURE — 3079F DIAST BP 80-89 MM HG: CPT | Mod: CPTII,,, | Performed by: STUDENT IN AN ORGANIZED HEALTH CARE EDUCATION/TRAINING PROGRAM

## 2025-04-23 PROCEDURE — 1159F MED LIST DOCD IN RCRD: CPT | Mod: CPTII,,, | Performed by: STUDENT IN AN ORGANIZED HEALTH CARE EDUCATION/TRAINING PROGRAM

## 2025-04-23 PROCEDURE — 1160F RVW MEDS BY RX/DR IN RCRD: CPT | Mod: CPTII,,, | Performed by: STUDENT IN AN ORGANIZED HEALTH CARE EDUCATION/TRAINING PROGRAM

## 2025-04-23 PROCEDURE — 3008F BODY MASS INDEX DOCD: CPT | Mod: CPTII,,, | Performed by: STUDENT IN AN ORGANIZED HEALTH CARE EDUCATION/TRAINING PROGRAM

## 2025-04-23 PROCEDURE — 99213 OFFICE O/P EST LOW 20 MIN: CPT | Mod: PBBFAC,PN | Performed by: STUDENT IN AN ORGANIZED HEALTH CARE EDUCATION/TRAINING PROGRAM

## 2025-04-23 PROCEDURE — 3074F SYST BP LT 130 MM HG: CPT | Mod: CPTII,,, | Performed by: STUDENT IN AN ORGANIZED HEALTH CARE EDUCATION/TRAINING PROGRAM

## 2025-04-23 PROCEDURE — 99214 OFFICE O/P EST MOD 30 MIN: CPT | Mod: S$PBB,,, | Performed by: STUDENT IN AN ORGANIZED HEALTH CARE EDUCATION/TRAINING PROGRAM

## 2025-04-23 RX ORDER — BUSPIRONE HYDROCHLORIDE 10 MG/1
20 TABLET ORAL 3 TIMES DAILY
Qty: 180 TABLET | Refills: 11 | Status: SHIPPED | OUTPATIENT
Start: 2025-04-23 | End: 2026-04-23

## 2025-04-23 RX ORDER — ATOMOXETINE 80 MG/1
80 CAPSULE ORAL DAILY
Qty: 30 CAPSULE | Refills: 11 | Status: SHIPPED | OUTPATIENT
Start: 2025-04-23

## 2025-04-23 NOTE — PROGRESS NOTES
"Outpatient Psychiatry Follow-Up Visit    4/23/2025    Clinical Status of Patient:  Outpatient (Ambulatory)    Chief Complaint:  Yessy Brooks is a 29 y.o. female who presents today for follow-up of anxiety and attention problems. Patient last seen for follow-up on 10/30/2024. Met with patient.      Interval History and Content of Current Session:  Interim Events/Subjective Report/Content of Current Session:   Pt reports doing "ok"  overall.  Notes that their partner is planning to move in with them, notes added stress from this.  Feels like this is a positive change but notes that there has been additional stress.  Reports less stable mood, denies overt depression, increased anxiety, more frequent panic attacks (1x weekly).  Sleep "alright," 8 hrs nightly, latency 30-60 minutes, 1-2x nightly awakenings, rested on awakening. Appetite stable, weight decreased (slightly, unintentional).  Energy good in the morning with lower in the afternoon, motivation "ok," concentration good.  Endorses irritability, denies hopelessness.  Denies SI/HI/AVH/paranoia, denies plan or desire for self harm or harm to others.  Denies SE from current regimen. Reports somatic complaints of R shoulder pain. Pt voices desire to adjust regimen to address ongoing symptoms.      Psychiatric Review of Systems-is patient experiencing or having changes in  Integrated into HPI above.     Review of Systems   PSYCHIATRIC: Pertinant items are noted in the narrative.  CONSTITUTIONAL: +weight loss, denies fatigue, denies f/c  MUSCULOSKELETAL: +R shoulder pain, denies myalgias  NEUROLOGIC: No weakness, sensory changes, seizures, confusion, memory loss, tremor or other abnormal movements.  CARDIAC: No CP, no palpitations  RESPIRATORY: No shortness of breath.  CARDIOVASCULAR: No tachycardia or chest pain.  GASTROINTESTINAL: No nausea, vomiting, pain, constipation or diarrhea.    Past Medical, Family and Social History: The patient's past medical, family " "and social history have been reviewed and updated as appropriate within the electronic medical record - see encounter notes.    Compliance: good    Side effects: denies    Risk Parameters:  Patient reports suicidal ideation: passive, fleeting  Patient reports no homicidal ideation  Patient reports no self-injurious behavior  Patient reports no violent behavior    Exam (detailed: at least 9 elements; comprehensive: all 15 elements)   Constitutional  Vitals:  Most recent vital signs, dated less than 90 days prior to this appointment, were reviewed.     Vitals:    04/23/25 0759   BP: 119/80   Pulse: 91   Resp: 18   Temp: 97.8 °F (36.6 °C)   TempSrc: Oral   SpO2: 98%   Weight: 100.8 kg (222 lb 4.8 oz)   Height: 5' 9" (1.753 m)        General:   Constitutional: No acute distress, appears stated age, casually dressed    Neurologic:   Motor: moves all extremities spontaneously and without difficulty, no abnormal involuntary movements observed  Gait: normal gait and station    Mental status examination:   Appearance: appears stated age, casually dressed, no acute distress  Behavior: unremarkable for situation, calm and cooperative  Mood: "good"  Affect: mood congruent and constricted  Thought process: linear and goal directed  Thought content: no plan or desire for self harm or harm to others, denies paranoia, no delusional ideation volunteered  Perceptions: denies hallucinations or other altered perceptions  Associations: appropriate for conversation  Orientation: oriented to day of week, month, year, location, and situation  Language: English, fluid  Attention: able to attend to interview  Insight: good  Judgement: good    PHQ9:  Over the last two weeks how often have you been bothered by little interest or pleasure in doing things: 0  Over the last two weeks how often have you been bothered by feeling down, depressed or hopeless: 1  PHQ-2 Total Score: 1  PHQ-9 Score: 9  PHQ-9 Interpretation: Mild        4/23/2025     " 8:57 AM 10/30/2024     8:05 AM 7/30/2024     7:57 AM   GAD7   1. Feeling nervous, anxious, or on edge? 2 1 1   2. Not being able to stop or control worrying? 2 1 1   3. Worrying too much about different things? 3 1 1   4. Trouble relaxing? 3 1 1   5. Being so restless that it is hard to sit still? 1 1 1   6. Becoming easily annoyed or irritable? 1 0 0   7. Feeling afraid as if something awful might happen? 2 1 1   8. If you checked off any problems, how difficult have these problems made it for you to do your work, take care of things at home, or get along with other people? 2 1 1   KEVEN-7 Score 14 6 6     Assessment and Diagnosis   Status/Progress: Based on the examination today, the patient's problem(s) is/are adequately but not ideally controlled.  New problems have not been presented today.   Co-morbidities and Lack of compliance are not complicating management of the primary condition.  Number of separate conditions addressed during today's visit: 2 (ADHD well controlled, anxiety fair but not ideally controlled).  Medication management: yes: Modifying an existing prescription, Refilling a prescription, and Deciding to continue a pre-existing prescription.  Are referral(s) being ordered today: No.  Complexity (level) of medical decision making employed in the encounter: MODERATE.    General Impression:    ICD-10-CM ICD-9-CM   1. Adult ADHD  F90.9 314.01   2. KEVEN (generalized anxiety disorder)  F41.1 300.02     Intervention/Counseling/Treatment Plan   Increase buspirone to 20mg tid  Continue atomoxetine 80mg daily for inattention  Consider trial of effexor if above medication change is inadequately effective, can also consider trial of trintellix vs viibryd if needed  Recommend pt continue with her psychotherapist/counselor  No need for PEC as pt is not an imminent danger to self or others or gravely disabled due to acute psychiatric illness  Discussed that pt should either call clinic for psychiatric crisis  symptoms or present to nearest emergency room    Discussed with patient informed consent including diagnosis, risks and benefits of proposed treatment above vs. alternative treatments vs. no treatment, as well as serious and common side effects of these treatments, and the inherent unpredictability of individual responses to these treatments. The patient expresses understanding of the above and displays the capacity to agree with this current plan. Patient also agrees that, currently, the benefits outweigh the risks and would like to pursue treatment at this time, and had no other questions.    Instructions:  Take all medications as prescribed.    Abstain from recreational drugs and alcohol.  Present to ED or call 911 for SI/HI plan or intent, psychosis, or medical emergency.    Return to Clinic: Follow up if symptoms worsen or fail to improve.  Given that provider is leaving in the near future, will send list of psychiatry providers to pt's portal account to assist with continuation of care.     Total time:   The total time for services performed on the date of the encounter (including review of prior visit notes, review of notes from other providers, review of results from laboratory/imaging studies, face-to-face time with patient, and time spent on other activities directly related to patient care): 30 minutes.    Yahir Morel MD  [facility]

## 2025-05-13 ENCOUNTER — TELEPHONE (OUTPATIENT)
Dept: PRIMARY CARE CLINIC | Facility: CLINIC | Age: 30
End: 2025-05-13
Payer: COMMERCIAL

## 2025-06-19 ENCOUNTER — OFFICE VISIT (OUTPATIENT)
Dept: BEHAVIORAL HEALTH | Facility: CLINIC | Age: 30
End: 2025-06-19
Payer: COMMERCIAL

## 2025-06-19 VITALS
SYSTOLIC BLOOD PRESSURE: 121 MMHG | HEIGHT: 69 IN | WEIGHT: 222.63 LBS | DIASTOLIC BLOOD PRESSURE: 83 MMHG | BODY MASS INDEX: 32.97 KG/M2

## 2025-06-19 DIAGNOSIS — F33.1 MODERATE EPISODE OF RECURRENT MAJOR DEPRESSIVE DISORDER: Primary | Chronic | ICD-10-CM

## 2025-06-19 DIAGNOSIS — F90.9 ADULT ADHD: ICD-10-CM

## 2025-06-19 DIAGNOSIS — F41.1 GAD (GENERALIZED ANXIETY DISORDER): ICD-10-CM

## 2025-06-19 PROCEDURE — 1159F MED LIST DOCD IN RCRD: CPT | Mod: CPTII,,, | Performed by: NURSE PRACTITIONER

## 2025-06-19 PROCEDURE — 3079F DIAST BP 80-89 MM HG: CPT | Mod: CPTII,,, | Performed by: NURSE PRACTITIONER

## 2025-06-19 PROCEDURE — 1160F RVW MEDS BY RX/DR IN RCRD: CPT | Mod: CPTII,,, | Performed by: NURSE PRACTITIONER

## 2025-06-19 PROCEDURE — 99213 OFFICE O/P EST LOW 20 MIN: CPT | Mod: PBBFAC,PN | Performed by: NURSE PRACTITIONER

## 2025-06-19 PROCEDURE — 3074F SYST BP LT 130 MM HG: CPT | Mod: CPTII,,, | Performed by: NURSE PRACTITIONER

## 2025-06-19 PROCEDURE — 99214 OFFICE O/P EST MOD 30 MIN: CPT | Mod: S$PBB,,, | Performed by: NURSE PRACTITIONER

## 2025-06-19 PROCEDURE — 3008F BODY MASS INDEX DOCD: CPT | Mod: CPTII,,, | Performed by: NURSE PRACTITIONER

## 2025-06-19 RX ORDER — VILAZODONE HYDROCHLORIDE 10 MG/1
10 TABLET ORAL DAILY
Qty: 30 TABLET | Refills: 3 | Status: SHIPPED | OUTPATIENT
Start: 2025-06-19

## 2025-06-19 RX ORDER — ATOMOXETINE 100 MG/1
100 CAPSULE ORAL DAILY
Qty: 30 CAPSULE | Refills: 3 | Status: SHIPPED | OUTPATIENT
Start: 2025-06-19

## 2025-06-19 NOTE — PROGRESS NOTES
Initial Visit 06/19/2025  HPI: Yessy Brooks is a 29 y.o. female here today for medication management of   Past Medical History: No past medical history on file.       Last visit:  Patient was last seen by Dr. Morel on 10/30/2024.  At that time she was taking Strattera 80 mg a day and BuSpar 20 mg 3 times a day.    This visit: This provider is assuming care of patient from Dr. Morel. Today, patient states that she would like to increase the Strattera and possibly start an antidepressant.  Currently, she is taking Strattera 80 mg a day and BuSpar 20 mg 3 times a day.   She states that she has tried Zoloft and it made her flat. She states that she and Dr. Morel discussed three options: Viibryd, Lexapro, and Trintellix. She has researched these and has decided that she would like to try Viibryd.   Will increase the Strattera to 100mg a day.   Will start Viibryd 10mg a day and titrate up as needed.     PHQ:  06/19/2025: 17 moderate    KEVEN:   06/19/2025: 8 mild    Mental Status Evaluation:  Appearance:  unremarkable, age appropriate   Behavior:  normal, cooperative   Speech:  no latency; no press   Mood:  steady   Affect:  congruent and appropriate   Thought Process:  normal and logical   Thought Content:  normal, no suicidality, no homicidality, delusions, or paranoia   Sensorium:  grossly intact   Cognition:  grossly intact   Insight:  intact   Judgment:  behavior is adequate to circumstances     Impression:      ICD-10-CM ICD-9-CM   1. Moderate episode of recurrent major depressive disorder  F33.1 296.32   2. KEVEN (generalized anxiety disorder)  F41.1 300.02   3. Adult ADHD  F90.9 314.01        Plan:  1. Continue Buspirone 20mg TID  2. Increase Atomoxetine to 100mg daily for inattention  3. Start Viibryd 10mg a day   4. Recommend pt continue with her psychotherapist/counselor      No need for PEC as pt is not an imminent danger to self or others or gravely disabled due to acute psychiatric illness     Discussed  "that pt should either call clinic for psychiatric crisis symptoms or present to nearest emergency room     Discussed with patient informed consent including diagnosis, risks and benefits of proposed treatment above vs. alternative treatments vs. no treatment, as well as serious and common side effects of these treatments, and the inherent unpredictability of individual responses to these treatments. The patient expresses understanding of the above and displays the capacity to agree with this current plan. Patient also agrees that, currently, the benefits outweigh the risks and would like to pursue treatment at this time, and had no other questions.     Instructions:  Take all medications as prescribed.    2. Abstain from recreational drugs and alcohol.  3. Present to ED or call 911 for SI/HI plan or intent, psychosis, or medical emergency.    Follow-up  Follow up in about 3 months (around 9/19/2025) for medication management, face to face.      Outpatient Psychiatry Follow-Up Visit  10/30/2024   Clinical Status of Patient:  Outpatient (Ambulatory)   Chief Complaint:  Yessy Brooks is a 29 y.o. female who presents today for follow-up of anxiety and attention problems. Patient last seen for follow-up on 7/30/2024. Met with patient.       Interval History and Content of Current Session:  Interim Events/Subjective Report/Content of Current Session:   Pt reports doing "pretty good"  overall.  Has started working a new job (same company with different responsibilities), feels she will like this new job.  Reports "much better" mood, denies current depression,"a lot lower" anxiety.  Sleep "usual," 6-8 hrs nightly, sometimes tire on awakening. Appetite "normal", weight stable.  Energy "regular", motivation improved, concentration "alright".  Denies irritability, denies hopelessness.  Denies SI/HI/AVH/paranoia, denies plan or desire for self harm or harm to others.  Denies SE from current regimen. Reports somatic " "complaints of R shoulder pain. Pt happy with current regimen and wants to continue.      Psychiatric Review of Systems-is patient experiencing or having changes in  Integrated into HPI above.      Review of Systems   PSYCHIATRIC: Pertinant items are noted in the narrative.  CONSTITUTIONAL: +weight loss, denies fatigue, denies f/c  MUSCULOSKELETAL: R shoulder pain, denies myalgias  NEUROLOGIC: No weakness, sensory changes, seizures, confusion, memory loss, tremor or other abnormal movements.  CARDIAC: No CP, no palpitations  RESPIRATORY: No shortness of breath.  CARDIOVASCULAR: No tachycardia or chest pain.  GASTROINTESTINAL: No nausea, vomiting, pain, constipation or diarrhea.     Past Medical, Family and Social History: The patient's past medical, family and social history have been reviewed and updated as appropriate within the electronic medical record - see encounter notes.     Compliance: good     Side effects: denies     Risk Parameters:  Patient reports suicidal ideation: passive, fleeting  Patient reports no homicidal ideation  Patient reports no self-injurious behavior  Patient reports no violent behavior     Exam (detailed: at least 9 elements; comprehensive: all 15 elements)   Constitutional  Vitals:  Most recent vital signs, dated less than 90 days prior to this appointment, were reviewed.          Vitals:     10/30/24 0806   BP: 133/81   Pulse: 96   Temp: 97.7 °F (36.5 °C)   SpO2: 100%   Weight: 102.6 kg (226 lb 1.6 oz)         General:   Constitutional: No acute distress, appears stated age, casually dressed     Neurologic:   Motor: moves all extremities spontaneously and without difficulty, no abnormal involuntary movements observed  Gait: normal gait and station     Mental status examination:   Appearance: appears stated age, casually dressed, no acute distress  Behavior: unremarkable for situation, calm and cooperative  Mood: "good"  Affect: mood congruent and constricted  Thought process: linear " and goal directed  Thought content: no plan or desire for self harm or harm to others, denies paranoia, no delusional ideation volunteered  Perceptions: denies hallucinations or other altered perceptions  Associations: appropriate for conversation  Orientation: oriented to day of week, month, year, location, and situation  Language: English, fluid  Attention: able to attend to interview  Insight: good  Judgement: good     PHQ9:  Over the last two weeks how often have you been bothered by little interest or pleasure in doing things: 0  Over the last two weeks how often have you been bothered by feeling down, depressed or hopeless: 0  PHQ-2 Total Score: 0  PHQ-9 Score: 1  PHQ-9 Interpretation: Minimal or None          10/30/2024     8:05 AM 7/30/2024     7:57 AM 5/22/2024    10:05 AM   GAD7   1. Feeling nervous, anxious, or on edge? 1 1 3   2. Not being able to stop or control worrying? 1 1 3   3. Worrying too much about different things? 1 1 3   4. Trouble relaxing? 1 1 3   5. Being so restless that it is hard to sit still? 1 1 0   6. Becoming easily annoyed or irritable? 0 0 0   7. Feeling afraid as if something awful might happen? 1 1 3   8. If you checked off any problems, how difficult have these problems made it for you to do your work, take care of things at home, or get along with other people? 1 1 3   KEVEN-7 Score 6 6 15      Assessment and Diagnosis   Status/Progress: Based on the examination today, the patient's problem(s) is/are well controlled.  New problems have not been presented today.   Co-morbidities and Lack of compliance are not complicating management of the primary condition.  Number of separate conditions addressed during today's visit: 2 (ADHD well controlled, anxiety well controlled).  Medication management: yes: Refilling a prescription and Deciding to continue a pre-existing prescription.  Are referral(s) being ordered today: No.  Complexity (level) of medical decision making employed in the  encounter: MODERATE.     General Impression:      ICD-10-CM ICD-9-CM   1. KEVEN (generalized anxiety disorder)  F41.1 300.02   2. Adult ADHD  F90.9 314.01      Intervention/Counseling/Treatment Plan   Continue buspirone 15mg tid  Continue atomoxetine 80mg daily for inattention  Recommend pt continue with her psychotherapist/counselor  No need for PEC as pt is not an imminent danger to self or others or gravely disabled due to acute psychiatric illness  Discussed that pt should either call clinic for psychiatric crisis symptoms or present to nearest emergency room     Discussed with patient informed consent including diagnosis, risks and benefits of proposed treatment above vs. alternative treatments vs. no treatment, as well as serious and common side effects of these treatments, and the inherent unpredictability of individual responses to these treatments. The patient expresses understanding of the above and displays the capacity to agree with this current plan. Patient also agrees that, currently, the benefits outweigh the risks and would like to pursue treatment at this time, and had no other questions.     Instructions:  Take all medications as prescribed.    Abstain from recreational drugs and alcohol.  Present to ED or call 911 for SI/HI plan or intent, psychosis, or medical emergency.     Return to Clinic: Follow up in about 3 months (around 1/30/2025).     Total time:   The total time for services performed on the date of the encounter (including review of prior visit notes, review of notes from other providers, review of results from laboratory/imaging studies, face-to-face time with patient, and time spent on other activities directly related to patient care): 20 minutes.     Yahir Morel MD  [facility]

## 2025-06-22 NOTE — PROGRESS NOTES
"  Formerly [facility]    Patient ID: [id number]     Chief Complaint: Follow-up (F/u appointment. C/o right shoulder pain. Rates pain 1/10. States has been "strained throughout the years")      HPI:   HPI    Yessy Brooks is a 29 y.o. female here today for Follow-up (F/u appointment. C/o right shoulder pain. Rates pain 1/10. States has been "strained throughout the years")      History of Present Illness    06/23/2025: Patient presents to clinic today to establish care with new provider in clinic and wellness visit. Current medical conditions include ADHD, KEVEN, obesity.  Followed by mental health. Reporting left shoulder pain, described as stiffness. She feels that it is from repetitive movement with heavy lifting. Started mickie 2 years ago, pain increases when sleeping on it. Originally pain was increased with movement but this has resolved.  Pain improves with massage and heat. She declines PT referral. Is taking OTC medication to relieve pain.          Health Maintenance         Date Due Completion Date    Hepatitis C Screening Never done ---    HIV Screening Never done ---    Pap Smear 06/23/2025 (Originally 4/6/2025) 4/6/2022    TETANUS VACCINE 04/11/2032 4/11/2022    RSV Vaccine (Age 60+ and Pregnant patients) (1 - 1-dose 75+ series) 07/28/2072 ---            History reviewed. No pertinent past medical history.     History reviewed. No pertinent surgical history.     Social History     Tobacco Use    Smoking status: Never    Smokeless tobacco: Never   Substance and Sexual Activity    Alcohol use: Yes     Comment: occ    Drug use: Yes     Types: Marijuana     Comment: Maybe once a week.    Sexual activity: Yes     Partners: Male        Current Outpatient Medications   Medication Instructions    atomoxetine (STRATTERA) 100 mg, Oral, Daily    busPIRone (BUSPAR) 20 mg, Oral, 3 times daily    vilazodone (VIIBRYD) 10 mg, Oral, Daily       Review of patient's allergies indicates:  No Known Allergies     Patient Care " "Team:  Sarah Johnson APRN as Nurse Practitioner (Behavioral Health)     Subjective:     Review of Systems   Constitutional:  Negative for appetite change, chills, diaphoresis and fever.   HENT:  Negative for ear pain, sinus pain and sore throat.    Eyes:  Negative for pain and visual disturbance.   Respiratory:  Negative for cough, shortness of breath and wheezing.    Cardiovascular:  Negative for chest pain, palpitations and leg swelling.   Gastrointestinal:  Negative for abdominal pain, blood in stool, diarrhea, nausea and vomiting.   Endocrine: Negative for cold intolerance.   Genitourinary:  Negative for difficulty urinating, dysuria, frequency and hematuria.   Musculoskeletal:  Negative for arthralgias, joint swelling and myalgias (right shoulder pain).   Skin:  Negative for color change and rash.   Allergic/Immunologic: Negative.    Neurological: Negative.  Negative for dizziness, syncope, light-headedness and numbness.   Hematological: Negative.    Psychiatric/Behavioral: Negative.  Negative for dysphoric mood and suicidal ideas. The patient is not nervous/anxious.    All other systems reviewed and are negative.      12 point review of systems conducted, negative except as stated in the history of present illness. See HPI for details.    Objective:     Visit Vitals  /84 (BP Location: Right arm, Patient Position: Sitting)   Pulse 95   Temp 98.2 °F (36.8 °C) (Oral)   Resp 18   Ht 5' 9" (1.753 m)   Wt 100.4 kg (221 lb 4.8 oz)   SpO2 98%   BMI 32.68 kg/m²       Physical Exam  Vitals and nursing note reviewed.   Constitutional:       General: She is not in acute distress.     Appearance: She is not ill-appearing.   HENT:      Head: Normocephalic and atraumatic.      Mouth/Throat:      Mouth: Mucous membranes are moist.      Pharynx: Oropharynx is clear.   Eyes:      General: No scleral icterus.     Extraocular Movements: Extraocular movements intact.      Conjunctiva/sclera: Conjunctivae normal.     "  Pupils: Pupils are equal, round, and reactive to light.   Neck:      Vascular: No carotid bruit.   Cardiovascular:      Rate and Rhythm: Normal rate and regular rhythm.      Heart sounds: No murmur heard.     No friction rub. No gallop.   Pulmonary:      Effort: Pulmonary effort is normal. No respiratory distress.      Breath sounds: Normal breath sounds. No wheezing, rhonchi or rales.   Abdominal:      General: Abdomen is flat. Bowel sounds are normal. There is no distension.      Palpations: Abdomen is soft. There is no mass.      Tenderness: There is no abdominal tenderness.   Musculoskeletal:         General: Normal range of motion.      Right shoulder: Normal.      Left shoulder: Normal.      Cervical back: Normal range of motion and neck supple.   Skin:     General: Skin is warm and dry.   Neurological:      General: No focal deficit present.      Mental Status: She is alert.   Psychiatric:         Mood and Affect: Mood normal.         Labs Reviewed:     Chemistry:  Lab Results   Component Value Date     04/19/2024    K 4.7 04/19/2024    BUN 10.1 04/19/2024    CREATININE 0.74 04/19/2024    EGFRNORACEVR >60 04/19/2024    CALCIUM 9.8 04/19/2024    ALKPHOS 55 04/19/2024    ALBUMIN 4.2 04/19/2024    BILIDIR 0.2 01/24/2022    IBILI 0.50 01/24/2022    AST 22 04/19/2024    ALT 33 04/19/2024    NMUCDNUC80IC 34.4 08/08/2023    TSH 2.896 08/08/2023    GQJDWJ0CHXC 0.90 08/08/2023        Lab Results   Component Value Date    HGBA1C 5.2 08/08/2023        Hematology:  Lab Results   Component Value Date    WBC 9.22 08/08/2023    HGB 15.1 08/08/2023    HCT 46.0 08/08/2023     08/08/2023       Lipid Panel:  Lab Results   Component Value Date    CHOL 198 04/19/2024    HDL 34 (L) 04/19/2024    .00 04/19/2024    TRIG 156 (H) 04/19/2024    TOTALCHOLEST 6 (H) 04/19/2024        Urine:  Lab Results   Component Value Date    APPEARANCEUA Turbid (A) 04/19/2024    SGUA 1.031 (H) 04/19/2024    PROTEINUA Trace (A)  04/19/2024    KETONESUA Negative 04/19/2024    LEUKOCYTESUR 75 (A) 04/19/2024    RBCUA 0-5 04/19/2024    WBCUA 6-10 (A) 04/19/2024    BACTERIA Occ (A) 04/19/2024    SQEPUA Moderate (A) 04/19/2024    HYALINECASTS None Seen 04/19/2024        Assessment:       ICD-10-CM ICD-9-CM   1. Well adult exam  Z00.00 V70.0   2. Chronic right shoulder pain  M25.511 719.41    G89.29 338.29   3. Class 1 obesity with body mass index (BMI) of 32.0 to 32.9 in adult, unspecified obesity type, unspecified whether serious comorbidity present  E66.811 278.00    Z68.32 V85.32        Plan:     1. Well adult exam  Assessment & Plan:  - Pt wellness visit completed today with appropriate lab work.   - HM Topics Reviewed / Updated  - Immunizations Discussed  - Dicussed Healthy Diet  - Encouraged to exercise 3 x weekly  - Increase Water Intake  - Eat more fruits and vegetables  - Avoid soda & alcohol    Orders:  -     Cancel: Hepatitis Panel, Acute  -     Cancel: HIV 1/2 Ag/Ab (4th Gen)  -     Cancel: SYPHILIS ANTIBODY (WITH REFLEX RPR)  -     Cancel: Sexually-Transmitted Infections (STIs) Increased Risk Panel  -     Cancel: CBC Auto Differential  -     Cancel: Lipid Panel  -     Cancel: TSH  -     Cancel: Hemoglobin A1C  -     Cancel: Urinalysis  -     Cancel: Vitamin D  -     Cancel: Comprehensive Metabolic Panel  -     Ambulatory referral/consult to Gynecology; Future; Expected date: 06/30/2025  -     Hepatitis Panel, Acute; Future; Expected date: 06/23/2025  -     HIV 1/2 Ag/Ab (4th Gen); Future; Expected date: 06/23/2025  -     SYPHILIS ANTIBODY (WITH REFLEX RPR); Future; Expected date: 06/23/2025  -     Sexually-Transmitted Infections (STIs) Increased Risk Panel; Future; Expected date: 06/23/2025  -     CBC Auto Differential; Future; Expected date: 06/23/2025  -     Comprehensive Metabolic Panel; Future; Expected date: 06/23/2025  -     Lipid Panel; Future; Expected date: 06/23/2025  -     TSH; Future; Expected date: 06/23/2025  -      Hemoglobin A1C; Future; Expected date: 06/23/2025  -     Urinalysis; Future; Expected date: 06/23/2025  -     Vitamin D; Future; Expected date: 06/23/2025    2. Chronic right shoulder pain  Assessment & Plan:  - xray ordered  - consider PT if pain not resolved  - take OTC NSAID as ordered    Orders:  -     X-ray Shoulder 2 or More Views Right; Future; Expected date: 06/23/2025    3. Class 1 obesity with body mass index (BMI) of 32.0 to 32.9 in adult, unspecified obesity type, unspecified whether serious comorbidity present  Overview:  Wt Readings from Last 3 Encounters:   06/23/25 100.4 kg (221 lb 4.8 oz)   06/19/25 101 kg (222 lb 9.6 oz)   04/23/25 100.8 kg (222 lb 4.8 oz)       Assessment & Plan:  - Body mass index is 32.68 kg/m².  - Exercise (30min/day for 5days a week)   - Follow a balanced low-calorie, low-fat/low-calorie, moderate-fat/low-calorie, or low-carbohydrate diets  - Obesity is a serious, chronic, and progressive disease and is associated with a significant increase in mortality and many health risks including type 2 diabetes mellitus, hypertension, dyslipidemia, and coronary heart disease. The benefits of weight loss include a reduction in the rate of progression from impaired glucose tolerance to diabetes, blood pressure in hypertensive patients, and lipid levels in higher risk patients. Other noncardiac benefits of weight loss includes reductions in urinary incontinence, sleep apnea, and depression, as well as improvements in quality of life, physical functioning, and mobility.        Follow up in about 2 weeks (around 7/7/2025) for Follow up, Virtual Visit. In addition to their scheduled follow up, the patient has also been instructed to follow up on as needed basis.     Future Appointments   Date Time Provider Department Center   6/25/2025  7:50 AM Analy Bullard FNP [facility]   7/11/2025  9:00 AM Cally Elliott FNP [facility]   10/3/2025  9:30 AM Sarah Johnson  BESS RAYMUNDO [facility]           ATILIO ELLIOTT

## 2025-06-23 ENCOUNTER — HOSPITAL ENCOUNTER (OUTPATIENT)
Dept: RADIOLOGY | Facility: HOSPITAL | Age: 30
Discharge: HOME | End: 2025-06-23
Attending: NURSE PRACTITIONER
Payer: COMMERCIAL

## 2025-06-23 ENCOUNTER — OFFICE VISIT (OUTPATIENT)
Dept: FAMILY MEDICINE | Facility: CLINIC | Age: 30
End: 2025-06-23
Payer: COMMERCIAL

## 2025-06-23 VITALS
WEIGHT: 221.31 LBS | SYSTOLIC BLOOD PRESSURE: 114 MMHG | HEART RATE: 95 BPM | BODY MASS INDEX: 32.78 KG/M2 | RESPIRATION RATE: 18 BRPM | HEIGHT: 69 IN | DIASTOLIC BLOOD PRESSURE: 84 MMHG | TEMPERATURE: 98 F | OXYGEN SATURATION: 98 %

## 2025-06-23 DIAGNOSIS — G89.29 CHRONIC RIGHT SHOULDER PAIN: ICD-10-CM

## 2025-06-23 DIAGNOSIS — M25.511 CHRONIC RIGHT SHOULDER PAIN: ICD-10-CM

## 2025-06-23 DIAGNOSIS — Z00.00 WELL ADULT EXAM: Primary | ICD-10-CM

## 2025-06-23 DIAGNOSIS — E66.811 CLASS 1 OBESITY WITH BODY MASS INDEX (BMI) OF 32.0 TO 32.9 IN ADULT, UNSPECIFIED OBESITY TYPE, UNSPECIFIED WHETHER SERIOUS COMORBIDITY PRESENT: Chronic | ICD-10-CM

## 2025-06-23 PROCEDURE — 99395 PREV VISIT EST AGE 18-39: CPT | Mod: S$PBB,,, | Performed by: NURSE PRACTITIONER

## 2025-06-23 PROCEDURE — 1160F RVW MEDS BY RX/DR IN RCRD: CPT | Mod: CPTII,,, | Performed by: NURSE PRACTITIONER

## 2025-06-23 PROCEDURE — 73030 X-RAY EXAM OF SHOULDER: CPT | Mod: TC,PN,RT

## 2025-06-23 PROCEDURE — 1159F MED LIST DOCD IN RCRD: CPT | Mod: CPTII,,, | Performed by: NURSE PRACTITIONER

## 2025-06-23 PROCEDURE — 3008F BODY MASS INDEX DOCD: CPT | Mod: CPTII,,, | Performed by: NURSE PRACTITIONER

## 2025-06-23 PROCEDURE — 3074F SYST BP LT 130 MM HG: CPT | Mod: CPTII,,, | Performed by: NURSE PRACTITIONER

## 2025-06-23 PROCEDURE — 3079F DIAST BP 80-89 MM HG: CPT | Mod: CPTII,,, | Performed by: NURSE PRACTITIONER

## 2025-06-23 PROCEDURE — 99214 OFFICE O/P EST MOD 30 MIN: CPT | Mod: PBBFAC,25,PN | Performed by: NURSE PRACTITIONER

## 2025-06-23 NOTE — ASSESSMENT & PLAN NOTE
- Pt wellness visit completed today with appropriate lab work.   -  Topics Reviewed / Updated  - Immunizations Discussed  - Dicussed Healthy Diet  - Encouraged to exercise 3 x weekly  - Increase Water Intake  - Eat more fruits and vegetables  - Avoid soda & alcohol

## 2025-06-23 NOTE — ASSESSMENT & PLAN NOTE
- Body mass index is 32.68 kg/m².  - Exercise (30min/day for 5days a week)   - Follow a balanced low-calorie, low-fat/low-calorie, moderate-fat/low-calorie, or low-carbohydrate diets  - Obesity is a serious, chronic, and progressive disease and is associated with a significant increase in mortality and many health risks including type 2 diabetes mellitus, hypertension, dyslipidemia, and coronary heart disease. The benefits of weight loss include a reduction in the rate of progression from impaired glucose tolerance to diabetes, blood pressure in hypertensive patients, and lipid levels in higher risk patients. Other noncardiac benefits of weight loss includes reductions in urinary incontinence, sleep apnea, and depression, as well as improvements in quality of life, physical functioning, and mobility.

## 2025-06-24 ENCOUNTER — LAB VISIT (OUTPATIENT)
Dept: LAB | Facility: HOSPITAL | Age: 30
End: 2025-06-24
Attending: NURSE PRACTITIONER
Payer: COMMERCIAL

## 2025-06-24 DIAGNOSIS — Z00.00 WELL ADULT EXAM: ICD-10-CM

## 2025-06-24 LAB
25(OH)D3+25(OH)D2 SERPL-MCNC: 28 NG/ML (ref 30–80)
ALBUMIN SERPL-MCNC: 3.9 G/DL (ref 3.5–5)
ALBUMIN/GLOB SERPL: 1.1 RATIO (ref 1.1–2)
ALP SERPL-CCNC: 53 UNIT/L (ref 40–150)
ALT SERPL-CCNC: 30 UNIT/L (ref 0–55)
ANION GAP SERPL CALC-SCNC: 8 MEQ/L
AST SERPL-CCNC: 20 UNIT/L (ref 11–45)
BACTERIA #/AREA URNS AUTO: ABNORMAL /HPF
BASOPHILS # BLD AUTO: 0.08 X10(3)/MCL
BASOPHILS NFR BLD AUTO: 0.7 %
BILIRUB SERPL-MCNC: 0.4 MG/DL
BILIRUB UR QL STRIP.AUTO: NEGATIVE
BUN SERPL-MCNC: 12.4 MG/DL (ref 7–18.7)
C TRACH RRNA UR QL NAA+PROBE: NOT DETECTED
CALCIUM SERPL-MCNC: 9.1 MG/DL (ref 8.4–10.2)
CHLORIDE SERPL-SCNC: 107 MMOL/L (ref 98–107)
CHOLEST SERPL-MCNC: 177 MG/DL
CHOLEST/HDLC SERPL: 5 {RATIO} (ref 0–5)
CLARITY UR: ABNORMAL
CO2 SERPL-SCNC: 24 MMOL/L (ref 22–29)
COLOR UR AUTO: YELLOW
CREAT SERPL-MCNC: 0.77 MG/DL (ref 0.55–1.02)
CREAT/UREA NIT SERPL: 16
EOSINOPHIL # BLD AUTO: 0.35 X10(3)/MCL (ref 0–0.9)
EOSINOPHIL NFR BLD AUTO: 3 %
ERYTHROCYTE [DISTWIDTH] IN BLOOD BY AUTOMATED COUNT: 12.4 % (ref 11.5–17)
EST. AVERAGE GLUCOSE BLD GHB EST-MCNC: 108.3 MG/DL
GFR SERPLBLD CREATININE-BSD FMLA CKD-EPI: >60 ML/MIN/1.73/M2
GLOBULIN SER-MCNC: 3.4 GM/DL (ref 2.4–3.5)
GLUCOSE SERPL-MCNC: 121 MG/DL (ref 74–100)
GLUCOSE UR QL STRIP: NORMAL
HAV IGM SERPL QL IA: NONREACTIVE
HBA1C MFR BLD: 5.4 %
HBV CORE IGM SERPL QL IA: NONREACTIVE
HBV SURFACE AG SERPL QL IA: NONREACTIVE
HCT VFR BLD AUTO: 43.2 % (ref 37–47)
HCV AB SERPL QL IA: NONREACTIVE
HDLC SERPL-MCNC: 36 MG/DL (ref 35–60)
HGB BLD-MCNC: 14.2 G/DL (ref 12–16)
HGB UR QL STRIP: NEGATIVE
HIV 1+2 AB+HIV1 P24 AG SERPL QL IA: NONREACTIVE
HYALINE CASTS #/AREA URNS LPF: ABNORMAL /LPF
IMM GRANULOCYTES # BLD AUTO: 0.04 X10(3)/MCL (ref 0–0.04)
IMM GRANULOCYTES NFR BLD AUTO: 0.3 %
KETONES UR QL STRIP: NEGATIVE
LDLC SERPL CALC-MCNC: 117 MG/DL (ref 50–140)
LEUKOCYTE ESTERASE UR QL STRIP: 25
LYMPHOCYTES # BLD AUTO: 3.82 X10(3)/MCL (ref 0.6–4.6)
LYMPHOCYTES NFR BLD AUTO: 32.8 %
MCH RBC QN AUTO: 29.7 PG (ref 27–31)
MCHC RBC AUTO-ENTMCNC: 32.9 G/DL (ref 33–36)
MCV RBC AUTO: 90.4 FL (ref 80–94)
MONOCYTES # BLD AUTO: 0.73 X10(3)/MCL (ref 0.1–1.3)
MONOCYTES NFR BLD AUTO: 6.3 %
MUCOUS THREADS URNS QL MICRO: ABNORMAL /LPF
N GONORRHOEA DNA UR QL NAA+PROBE: NOT DETECTED
NEUTROPHILS # BLD AUTO: 6.61 X10(3)/MCL (ref 2.1–9.2)
NEUTROPHILS NFR BLD AUTO: 56.9 %
NITRITE UR QL STRIP: NEGATIVE
NRBC BLD AUTO-RTO: 0 %
PH UR STRIP: 6.5 [PH]
PLATELET # BLD AUTO: 339 X10(3)/MCL (ref 130–400)
PMV BLD AUTO: 9.3 FL (ref 7.4–10.4)
POTASSIUM SERPL-SCNC: 4.4 MMOL/L (ref 3.5–5.1)
PROT SERPL-MCNC: 7.3 GM/DL (ref 6.4–8.3)
PROT UR QL STRIP: NEGATIVE
RBC # BLD AUTO: 4.78 X10(6)/MCL (ref 4.2–5.4)
RBC #/AREA URNS AUTO: ABNORMAL /HPF
SODIUM SERPL-SCNC: 139 MMOL/L (ref 136–145)
SP GR UR STRIP.AUTO: 1.02 (ref 1–1.03)
SQUAMOUS #/AREA URNS LPF: ABNORMAL /HPF
T PALLIDUM AB SER QL: NONREACTIVE
T VAGINALIS RRNA UR QL NAA+PROBE: NOT DETECTED
TRIGL SERPL-MCNC: 120 MG/DL (ref 37–140)
TSH SERPL-ACNC: 3.37 UIU/ML (ref 0.35–4.94)
UROBILINOGEN UR STRIP-ACNC: NORMAL
VLDLC SERPL CALC-MCNC: 24 MG/DL
WBC # BLD AUTO: 11.63 X10(3)/MCL (ref 4.5–11.5)
WBC #/AREA URNS AUTO: ABNORMAL /HPF

## 2025-06-24 PROCEDURE — 82306 VITAMIN D 25 HYDROXY: CPT

## 2025-06-24 PROCEDURE — 80074 ACUTE HEPATITIS PANEL: CPT

## 2025-06-24 PROCEDURE — 86780 TREPONEMA PALLIDUM: CPT

## 2025-06-24 PROCEDURE — 87491 CHLMYD TRACH DNA AMP PROBE: CPT

## 2025-06-24 PROCEDURE — 87389 HIV-1 AG W/HIV-1&-2 AB AG IA: CPT

## 2025-06-24 PROCEDURE — 80053 COMPREHEN METABOLIC PANEL: CPT

## 2025-06-24 PROCEDURE — 80061 LIPID PANEL: CPT

## 2025-06-24 PROCEDURE — 81015 MICROSCOPIC EXAM OF URINE: CPT

## 2025-06-24 PROCEDURE — 85025 COMPLETE CBC W/AUTO DIFF WBC: CPT

## 2025-06-24 PROCEDURE — 83036 HEMOGLOBIN GLYCOSYLATED A1C: CPT

## 2025-06-24 PROCEDURE — 84443 ASSAY THYROID STIM HORMONE: CPT

## 2025-06-25 ENCOUNTER — RESULTS FOLLOW-UP (OUTPATIENT)
Dept: FAMILY MEDICINE | Facility: CLINIC | Age: 30
End: 2025-06-25

## 2025-06-25 ENCOUNTER — OFFICE VISIT (OUTPATIENT)
Dept: GYNECOLOGY | Facility: CLINIC | Age: 30
End: 2025-06-25
Payer: COMMERCIAL

## 2025-06-25 VITALS
OXYGEN SATURATION: 96 % | HEIGHT: 69 IN | HEART RATE: 83 BPM | DIASTOLIC BLOOD PRESSURE: 83 MMHG | BODY MASS INDEX: 33.3 KG/M2 | WEIGHT: 224.81 LBS | TEMPERATURE: 98 F | RESPIRATION RATE: 18 BRPM | SYSTOLIC BLOOD PRESSURE: 117 MMHG

## 2025-06-25 DIAGNOSIS — Z30.40 SURVEILLANCE OF CONTRACEPTIVE IMPLANT: ICD-10-CM

## 2025-06-25 DIAGNOSIS — Z30.46 ENCOUNTER FOR SURVEILLANCE OF IMPLANTABLE SUBDERMAL CONTRACEPTIVE: ICD-10-CM

## 2025-06-25 DIAGNOSIS — Z00.00 WELL ADULT EXAM: ICD-10-CM

## 2025-06-25 DIAGNOSIS — Z12.4 ENCOUNTER FOR PAPANICOLAOU SMEAR FOR CERVICAL CANCER SCREENING: Primary | ICD-10-CM

## 2025-06-25 DIAGNOSIS — N30.01 ACUTE CYSTITIS WITH HEMATURIA: Primary | ICD-10-CM

## 2025-06-25 LAB
B-HCG UR QL: NEGATIVE
CTP QC/QA: YES

## 2025-06-25 PROCEDURE — 1159F MED LIST DOCD IN RCRD: CPT | Mod: CPTII,,,

## 2025-06-25 PROCEDURE — 3044F HG A1C LEVEL LT 7.0%: CPT | Mod: CPTII,,,

## 2025-06-25 PROCEDURE — 99214 OFFICE O/P EST MOD 30 MIN: CPT | Mod: PBBFAC

## 2025-06-25 PROCEDURE — 88174 CYTOPATH C/V AUTO IN FLUID: CPT

## 2025-06-25 PROCEDURE — 3008F BODY MASS INDEX DOCD: CPT | Mod: CPTII,,,

## 2025-06-25 PROCEDURE — 99385 PREV VISIT NEW AGE 18-39: CPT | Mod: S$PBB,,,

## 2025-06-25 PROCEDURE — 81025 URINE PREGNANCY TEST: CPT | Mod: PBBFAC

## 2025-06-25 PROCEDURE — 3079F DIAST BP 80-89 MM HG: CPT | Mod: CPTII,,,

## 2025-06-25 PROCEDURE — 3074F SYST BP LT 130 MM HG: CPT | Mod: CPTII,,,

## 2025-06-25 RX ORDER — CIPROFLOXACIN 250 MG/1
250 TABLET, FILM COATED ORAL EVERY 12 HOURS
Qty: 10 TABLET | Refills: 0 | Status: SHIPPED | OUTPATIENT
Start: 2025-06-25 | End: 2025-07-11 | Stop reason: SDUPTHER

## 2025-06-25 NOTE — PROGRESS NOTES
[facility] -  Gynecology / Women's Health Clinic     Subjective:      Patient ID: Yessy Brooks is a 29 y.o. female.    Chief Complaint:  Well Woman      History of Present Illness:  The patient G0 here for annual exam. Her LMP was 25. Period last 5-7 days and empties menstrual cup 1-2x/day. Admits cycles have been irregular every 1-2 months since Nexplanon insertion 2021. Denies history of abnormal paps. Last pap -NIL. Denies breast or urinary complaints. Denies pelvic pain, abnormal bleeding or discharge. Pt reports no STIs in the past and no concerns. HPV vaccinated. Contraception consist of Nexplanon, requesting to removal and insertion. Denies tobacco use. Denies fly hx of breast or ovarian cancer. Mother with uterine cancer, Father with colon cancer.    GYN & OB History:  Patient's last menstrual period was 2025 (approximate).   Date of Last Pap: 2022    OB History    Para Term  AB Living   0 0 0 0 0 0   SAB IAB Ectopic Multiple Live Births   0 0 0 0 0       History reviewed. No pertinent past medical history.     History reviewed. No pertinent surgical history.     Social History     Tobacco Use    Smoking status: Never     Passive exposure: Never    Smokeless tobacco: Never   Substance and Sexual Activity    Alcohol use: Yes     Comment: occ    Drug use: Yes     Types: Marijuana     Comment: Maybe once a week.    Sexual activity: Yes     Partners: Male     Birth control/protection: Implant        Current Outpatient Medications   Medication Instructions    atomoxetine (STRATTERA) 100 mg, Oral, Daily    busPIRone (BUSPAR) 20 mg, Oral, 3 times daily    ciprofloxacin HCl (CIPRO) 250 mg, Oral, Every 12 hours    vilazodone (VIIBRYD) 10 mg, Oral, Daily       Review of patient's allergies indicates:  No Known Allergies      Review of Systems:  Review of Systems  Negative except for pertinent findings for positives per HPI.     Objective:     Physical Exam  "  Visit Vitals  /83 (BP Location: Right arm, Patient Position: Sitting)   Pulse 83   Temp 97.9 °F (36.6 °C) (Oral)   Resp 18   Ht 5' 9" (1.753 m)   Wt 102 kg (224 lb 12.8 oz)   LMP 05/26/2025 (Approximate)   SpO2 96%   BMI 33.20 kg/m²       GENERAL: Well-developed female. No acute distress.    SKIN: Normal to inspection, warm and intact.  BREASTS: No rashes or erythema. No masses, lumps, discharge, tenderness.  VULVA: General appearance normal; external genitalia with no lesions or erythema.  VAGINA: Mucosa/vaginal vault pink, no abnormal discharge or lesions.  CERVIX: Pink, nulliparous appearing os, no erythema or abnormal discharge.  BIMANUAL EXAM: reveals a 8 week-sized uterus. The uterus is non tender. Bilateral adnexa reveal no tenderness.  PSYCHIATRIC: Patient is oriented to person, place, and time. Mood and affect are normal.    Assessment:       ICD-10-CM ICD-9-CM   1. Encounter for Papanicolaou smear for cervical cancer screening  Z12.4 V76.2   2. Well adult exam  Z00.00 V70.0   3. Surveillance of contraceptive implant  Z30.40 V25.43   4. Encounter for surveillance of implantable subdermal contraceptive  Z30.46 V25.43       Plan:     1. Encounter for Papanicolaou smear for cervical cancer screening  -     Liquid-Based Pap Smear, Screening  -     POCT urine pregnancy    2. Well adult exam  -     Ambulatory referral/consult to Gynecology    3. Surveillance of contraceptive implant    4. Encounter for surveillance of implantable subdermal contraceptive    Pap today  UPT- neg    Reviewed the risks and benefits of the following contraceptive agents:  Barrier protection (need to use with every use, protection against STDs);  Combined hormonal methods of contraception including pills, patch and ring with use daily, weekly and monthly use  Progesterone only methods including pills and depo injection (risk of irregular bleeding, possible 5# weight gain in first year with Depo)  LARC including implants " (Nexplanon) 3 years of protection against conception (risk of irregular bleeding for 3-6 months) and IUDs (Vivian, Kyleena, Mirena and Paragard with their use for 3, 5 and 10 years). Vivian, Kyleena and Mirena IUD has progesterone and many patients have decrease in their cycles, some with amenorrhea and may also have irregular bleeding for first 3-6 months. Paragard has no hormones, cycles continue and may be heavier.  Patient requesting to remove and insert a Nexplanon, initial insert 7/2021  Explained to pt that most common side effect is unpredictable bleeding. Periods may be heavier or lighter, longer or infrequent or bleeding between periods which may improve over time. Possible mood changes, headaches, acne and depression. Instructed to use condoms to prevent pregnancy from now to Nexplanon insertion and to prevent STDs    Call with any GYN concerns    Follow up in about 1 year (around 6/25/2026) for Annual.

## 2025-07-09 ENCOUNTER — TELEPHONE (OUTPATIENT)
Dept: FAMILY MEDICINE | Facility: CLINIC | Age: 30
End: 2025-07-09
Payer: COMMERCIAL

## 2025-07-11 ENCOUNTER — OFFICE VISIT (OUTPATIENT)
Dept: FAMILY MEDICINE | Facility: CLINIC | Age: 30
End: 2025-07-11
Payer: COMMERCIAL

## 2025-07-11 DIAGNOSIS — M25.511 CHRONIC RIGHT SHOULDER PAIN: ICD-10-CM

## 2025-07-11 DIAGNOSIS — G89.29 CHRONIC RIGHT SHOULDER PAIN: ICD-10-CM

## 2025-07-11 DIAGNOSIS — N30.01 ACUTE CYSTITIS WITH HEMATURIA: Primary | ICD-10-CM

## 2025-07-11 DIAGNOSIS — Z00.00 WELL ADULT EXAM: ICD-10-CM

## 2025-07-11 PROBLEM — S92.352A CLOSED FRACTURE OF BASE OF FIFTH METATARSAL BONE OF LEFT FOOT: Status: RESOLVED | Noted: 2022-08-22 | Resolved: 2025-07-11

## 2025-07-11 RX ORDER — CIPROFLOXACIN 250 MG/1
250 TABLET, FILM COATED ORAL EVERY 12 HOURS
Qty: 10 TABLET | Refills: 0 | Status: SHIPPED | OUTPATIENT
Start: 2025-07-11 | End: 2025-07-16

## 2025-07-11 NOTE — PROGRESS NOTES
[facility]        Patient ID: [id number]     Chief Complaint: Follow-up      HPI:     This is a telemedicine note. Patient was treated using telemedicine, real time audio and video, according to [facility] protocols. Cally TERRY FNP-C, conducted the visit from the [facility]. The patient participated in the visit at a non-[facility] location selected by the patient, identified below. I am licensed in the state where the patient stated they are located. The patient stated that they understood and accepted the privacy and security risks to their information at their location. This visit is not recorded.    Patient was located at the patient's home.      Yessy Brooks is a 29 y.o. female here today for a telemedicine visit.     History of Present Illness    06/23/2025: Patient presents to clinic today to establish care with new provider in clinic and wellness visit. Current medical conditions include ADHD, KEVEN, obesity.  Followed by mental health. Reporting right shoulder pain, described as stiffness. She feels that it is from repetitive movement with heavy lifting. Started mickie 2 years ago, pain increases when sleeping on it. Originally pain was increased with movement but this has resolved.  Pain improves with massage and heat. She declines PT referral. Is taking OTC medication to relieve pain.     07/11/2025: Patient presents to clinic today for follow up regarding lab results from [facility]. Patient was given cipro for UTI but she did not start medication.  She denies any symptoms such as dysuria, foul-smelling urine or incontinence.  She is unsure if medication can be filled, will send to pharmacy again.     At  patient reported right shoulder pain that was chronic in nature.  She feels that this pain has not changed.  X-ray negative at [facility].  Patient continues to decline PT referral.      Health Maintenance         Date Due Completion Date    Influenza Vaccine (1) 09/01/2025 10/11/2024    Override on  3/12/2024: Declined    Pap Smear 06/25/2028 6/25/2025    TETANUS VACCINE 04/11/2032 4/11/2022    RSV Vaccine (Age 60+ and Pregnant patients) (1 - 1-dose 75+ series) 07/28/2072 ---            Past Medical History:   Diagnosis Date    Closed fracture of base of fifth metatarsal bone of left foot 08/22/2022        History reviewed. No pertinent surgical history.     Social History     Tobacco Use    Smoking status: Never     Passive exposure: Never    Smokeless tobacco: Never   Substance and Sexual Activity    Alcohol use: Yes     Comment: occ    Drug use: Yes     Types: Marijuana     Comment: Maybe once a week.    Sexual activity: Yes     Partners: Male     Birth control/protection: Implant        Current Outpatient Medications   Medication Instructions    atomoxetine (STRATTERA) 100 mg, Oral, Daily    busPIRone (BUSPAR) 20 mg, Oral, 3 times daily    ciprofloxacin HCl (CIPRO) 250 mg, Oral, Every 12 hours    vilazodone (VIIBRYD) 10 mg, Oral, Daily       Review of patient's allergies indicates:  No Known Allergies     Patient Care Team:  Cally Elliott FNP as PCP - General (Nurse Practitioner)  Sarah Johnson APRN as Nurse Practitioner (Behavioral Health)     Subjective:     Review of Systems   Constitutional:  Negative for appetite change, chills, diaphoresis and fever.   HENT:  Negative for ear pain, sinus pain and sore throat.    Eyes:  Negative for pain and visual disturbance.   Respiratory:  Negative for cough, shortness of breath and wheezing.    Cardiovascular:  Negative for chest pain, palpitations and leg swelling.   Gastrointestinal:  Negative for abdominal pain, blood in stool, diarrhea, nausea and vomiting.   Endocrine: Negative for cold intolerance.   Genitourinary:  Negative for difficulty urinating, dysuria, frequency and hematuria.   Musculoskeletal:  Negative for arthralgias, joint swelling and myalgias (right shoulder pain).   Skin:  Negative for color change and rash.   Allergic/Immunologic:  Negative.    Neurological: Negative.  Negative for dizziness, syncope, light-headedness and numbness.   Hematological: Negative.    Psychiatric/Behavioral: Negative.  Negative for dysphoric mood and suicidal ideas. The patient is not nervous/anxious.    All other systems reviewed and are negative.      12 point review of systems conducted, negative except as stated in the history of present illness. See HPI for details.    Objective:     Visit Vitals  LMP 05/26/2025 (Approximate)       Physical Exam  Constitutional:       Appearance: Normal appearance.   HENT:      Head: Normocephalic and atraumatic.   Eyes:      Extraocular Movements: Extraocular movements intact.   Pulmonary:      Effort: Pulmonary effort is normal.   Neurological:      Mental Status: She is alert. Mental status is at baseline.   Psychiatric:         Mood and Affect: Mood normal.         Thought Content: Thought content normal.       Assessment:       ICD-10-CM ICD-9-CM   1. Acute cystitis with hematuria  N30.01 595.0   2. Chronic right shoulder pain  M25.511 719.41    G89.29 338.29   3. Well adult exam  Z00.00 V70.0        Labs Reviewed:     Chemistry:  Lab Results   Component Value Date     06/24/2025    K 4.4 06/24/2025    BUN 12.4 06/24/2025    CREATININE 0.77 06/24/2025    EGFRNORACEVR >60 06/24/2025    CALCIUM 9.1 06/24/2025    ALKPHOS 53 06/24/2025    ALBUMIN 3.9 06/24/2025    BILIDIR 0.2 01/24/2022    IBILI 0.50 01/24/2022    AST 20 06/24/2025    ALT 30 06/24/2025    XPZZRBEE01DQ 28 (L) 06/24/2025    TSH 3.371 06/24/2025    LFGQLZ9GVUV 0.90 08/08/2023        Lab Results   Component Value Date    HGBA1C 5.4 06/24/2025        Hematology:  Lab Results   Component Value Date    WBC 11.63 (H) 06/24/2025    HGB 14.2 06/24/2025    HCT 43.2 06/24/2025     06/24/2025       Lipid Panel:  Lab Results   Component Value Date    CHOL 177 06/24/2025    HDL 36 06/24/2025    .00 06/24/2025    TRIG 120 06/24/2025    TOTALCHOLEST 5  06/24/2025        Urine:  Lab Results   Component Value Date    APPEARANCEUA Turbid (A) 06/24/2025    SGUA 1.025 06/24/2025    PROTEINUA Negative 06/24/2025    KETONESUA Negative 06/24/2025    LEUKOCYTESUR 25 (A) 06/24/2025    RBCUA 6-10 (A) 06/24/2025    WBCUA 0-5 06/24/2025    BACTERIA Trace (A) 06/24/2025    SQEPUA Many (A) 06/24/2025    HYALINECASTS None Seen 06/24/2025        Plan:     1. Acute cystitis with hematuria  Assessment & Plan:  - Start antibiotics as prescribed.   - Complete the full course of the medication.  - Report any continuing signs such as nausea/vomiting,visible blood in urine, increased low back or flank pain, worsening burning upon urination after antibiotic completion or fever.  - Drink plenty of water.  - Avoid soda or carbonated beverages.   - Urinate frequently; do not hold urine for extended periods of time.  - Wear cotton underwear, avoid tight fitting pants.  - Wipe front to back, urinate after sexual intercourse, and avoid scented or irritating feminine products.      Orders:  -     ciprofloxacin HCl (CIPRO) 250 MG tablet; Take 1 tablet (250 mg total) by mouth every 12 (twelve) hours. for 5 days  Dispense: 10 tablet; Refill: 0    2. Chronic right shoulder pain  Assessment & Plan:  - xray completed 06/2025 unremarkable  - PT referral declined  - take OTC NSAID as ordered      3. Well adult exam  Assessment & Plan:  - Wellness labs for next year ordered  - Pt aware that she needs to complete them prior to appointment    Orders:  -     CBC Auto Differential; Future; Expected date: 07/01/2026  -     Comprehensive Metabolic Panel; Future; Expected date: 07/01/2026  -     Lipid Panel; Future; Expected date: 07/01/2026  -     TSH; Future; Expected date: 07/01/2026  -     Hemoglobin A1C; Future; Expected date: 07/01/2026  -     Urinalysis; Future; Expected date: 07/01/2026  -     Vitamin D; Future; Expected date: 07/01/2026      Follow up in about 1 year (around 7/11/2026) for with labwork  prior to visit, Wellness. In addition to their scheduled follow up, the patient has also been instructed to follow up on as needed basis.     Future Appointments   Date Time Provider Department Center   9/10/2025  1:50 PM Analy Bullard FNP [facility]    10/3/2025  9:30 AM Sarah Johnson APRN [facility]   6/26/2026  8:50 [facility]   7/14/2026  7:00 AM Cally Elliott FNP [facility]        Video Time Documentation:  This encounter lasted 20 minutes. More than 50% of this time was spent in counseling and coordination of care. This includes face to face time and non-face to face time preparing to see the patient (eg, review of tests), obtaining and/or reviewing separately obtained history, documenting clinical information in the electronic or other health record, independently interpreting results and communicating results to the patient/family/caregiver, or care coordinator.        ATILIO ELLIOTT

## 2025-07-11 NOTE — ASSESSMENT & PLAN NOTE
- Start antibiotics as prescribed.   - Complete the full course of the medication.  - Report any continuing signs such as nausea/vomiting,visible blood in urine, increased low back or flank pain, worsening burning upon urination after antibiotic completion or fever.  - Drink plenty of water.  - Avoid soda or carbonated beverages.   - Urinate frequently; do not hold urine for extended periods of time.  - Wear cotton underwear, avoid tight fitting pants.  - Wipe front to back, urinate after sexual intercourse, and avoid scented or irritating feminine products.